# Patient Record
Sex: MALE | Race: ASIAN | NOT HISPANIC OR LATINO | Employment: FULL TIME | URBAN - METROPOLITAN AREA
[De-identification: names, ages, dates, MRNs, and addresses within clinical notes are randomized per-mention and may not be internally consistent; named-entity substitution may affect disease eponyms.]

---

## 2017-09-20 ENCOUNTER — OFFICE VISIT (OUTPATIENT)
Dept: URGENT CARE | Facility: CLINIC | Age: 42
End: 2017-09-20
Payer: COMMERCIAL

## 2017-09-20 VITALS
OXYGEN SATURATION: 96 % | BODY MASS INDEX: 40.81 KG/M2 | HEIGHT: 67 IN | RESPIRATION RATE: 16 BRPM | TEMPERATURE: 98 F | HEART RATE: 95 BPM | WEIGHT: 260 LBS | SYSTOLIC BLOOD PRESSURE: 154 MMHG | DIASTOLIC BLOOD PRESSURE: 104 MMHG

## 2017-09-20 DIAGNOSIS — L50.9 URTICARIA: ICD-10-CM

## 2017-09-20 DIAGNOSIS — T78.40XA ALLERGIC REACTION, INITIAL ENCOUNTER: Primary | ICD-10-CM

## 2017-09-20 PROCEDURE — 99203 OFFICE O/P NEW LOW 30 MIN: CPT | Mod: 25,S$GLB,, | Performed by: FAMILY MEDICINE

## 2017-09-20 PROCEDURE — 96372 THER/PROPH/DIAG INJ SC/IM: CPT | Mod: S$GLB,,, | Performed by: FAMILY MEDICINE

## 2017-09-20 PROCEDURE — 3008F BODY MASS INDEX DOCD: CPT | Mod: S$GLB,,, | Performed by: FAMILY MEDICINE

## 2017-09-20 RX ORDER — TOBRAMYCIN AND DEXAMETHASONE 3; 1 MG/ML; MG/ML
2 SUSPENSION/ DROPS OPHTHALMIC EVERY 6 HOURS
Qty: 1 BOTTLE | Refills: 0 | Status: SHIPPED | OUTPATIENT
Start: 2017-09-20 | End: 2017-09-23

## 2017-09-20 RX ORDER — BETAMETHASONE SODIUM PHOSPHATE AND BETAMETHASONE ACETATE 3; 3 MG/ML; MG/ML
9 INJECTION, SUSPENSION INTRA-ARTICULAR; INTRALESIONAL; INTRAMUSCULAR; SOFT TISSUE
Status: COMPLETED | OUTPATIENT
Start: 2017-09-20 | End: 2017-09-20

## 2017-09-20 RX ORDER — PREDNISONE 20 MG/1
20 TABLET ORAL DAILY
Qty: 4 TABLET | Refills: 0 | Status: SHIPPED | OUTPATIENT
Start: 2017-09-20 | End: 2017-09-24

## 2017-09-20 RX ADMIN — BETAMETHASONE SODIUM PHOSPHATE AND BETAMETHASONE ACETATE 9 MG: 3; 3 INJECTION, SUSPENSION INTRA-ARTICULAR; INTRALESIONAL; INTRAMUSCULAR; SOFT TISSUE at 10:09

## 2017-09-20 NOTE — PROGRESS NOTES
"Subjective:       Patient ID: Mauricio Fernandes is a 42 y.o. male.    Vitals:  height is 5' 7" (1.702 m) and weight is 117.9 kg (260 lb). His temperature is 98.3 °F (36.8 °C). His blood pressure is 154/104 (abnormal) and his pulse is 95. His respiration is 16 and oxygen saturation is 96%.     Chief Complaint: Rash and Facial Swelling    PATIENT REPORTS WAKING UP EARLY THIS MORNING AT 3AM WITH AN ITCHY, RED RASH TO CHEST AND BACK. PATIENT TOOK BENADRYL WHICH SEEMED TO HELP WITH THE RASH ON CHEDST AND BACK. PATIENT ALSO REPORTS FACE AND EYE SWELLING WITH POSSIBLY A LITTLE SWELLING OF THE THROAT AT THE SAME TIME AS THE RASH APPEARED ON CHEST AND BACK THIS MORNING.      Rash   This is a new problem. The current episode started today. The problem has been gradually improving since onset. The affected locations include the chest and back. The rash is characterized by redness, itchiness and swelling. It is unknown if there was an exposure to a precipitant. Pertinent negatives include no fever, joint pain, shortness of breath or sore throat. Treatments tried: BENADRYL. The treatment provided moderate relief.   Edema   This is a new problem. The current episode started today. The problem occurs constantly. The problem has been gradually improving. Associated symptoms include a rash. Pertinent negatives include no chills, fever or sore throat. Nothing aggravates the symptoms. He has tried nothing for the symptoms. The treatment provided mild relief.     Review of Systems   Constitution: Negative for chills and fever.   HENT: Negative for sore throat.    Eyes: Positive for blurred vision.        FACIAL SWELLING AND BLURRY VISION   Respiratory: Negative for shortness of breath.    Skin: Positive for itching and rash.   Musculoskeletal: Negative for joint pain.       Objective:      Physical Exam   Constitutional: He is oriented to person, place, and time. He appears well-developed and well-nourished.   HENT:   Head: " Normocephalic and atraumatic. Head is without abrasion, without contusion and without laceration.   Right Ear: External ear normal.   Left Ear: External ear normal.   Nose: Nose normal.   Mouth/Throat: Posterior oropharyngeal erythema present.   Eyes: EOM and lids are normal. Pupils are equal, round, and reactive to light. Right conjunctiva is injected. Left conjunctiva is injected.   Right sclera injected.    Neck: Trachea normal, full passive range of motion without pain and phonation normal. Neck supple.   Cardiovascular: Normal rate, regular rhythm and normal heart sounds.    Pulmonary/Chest: Effort normal and breath sounds normal. No stridor. No respiratory distress.   Musculoskeletal: Normal range of motion.   Neurological: He is alert and oriented to person, place, and time.   Skin: Skin is warm, dry and intact. Capillary refill takes less than 2 seconds. Rash noted. No abrasion, no bruising, no burn, no ecchymosis, no laceration and no lesion noted. Rash is urticarial. There is erythema.   Patient has edema of right upper eyelid more than left and again edema of right lower eyelid more than left.  He has no further urticarial lesions on the back or chest at this time.     He does have a stable lipoma right anterior chest wall about 2 cm diameter.  He also has scattered acne appearing lesions over chest and back that are usual for him per patient.    Psychiatric: He has a normal mood and affect. His speech is normal and behavior is normal. Judgment and thought content normal. Cognition and memory are normal.   Nursing note and vitals reviewed.      Assessment:       1. Allergic reaction, initial encounter    2. Urticaria        Plan:         Allergic reaction, initial encounter  -     betamethasone acetate-betamethasone sodium phosphate injection 9 mg; Inject 1.5 mLs (9 mg total) into the muscle one time.  -     predniSONE (DELTASONE) 20 MG tablet; Take 1 tablet (20 mg total) by mouth once daily.  Dispense: 4  tablet; Refill: 0  -     tobramycin-dexamethasone 0.3-0.1% (TOBRADEX) 0.3-0.1 % DrpS; Place 2 drops into both eyes every 6 (six) hours.  Dispense: 1 Bottle; Refill: 0    Urticaria      Mauricio ACOSTA was seen today for rash and facial swelling.    Diagnoses and all orders for this visit:    Allergic reaction, initial encounter  -     betamethasone acetate-betamethasone sodium phosphate injection 9 mg; Inject 1.5 mLs (9 mg total) into the muscle one time.  -     predniSONE (DELTASONE) 20 MG tablet; Take 1 tablet (20 mg total) by mouth once daily.  -     tobramycin-dexamethasone 0.3-0.1% (TOBRADEX) 0.3-0.1 % DrpS; Place 2 drops into both eyes every 6 (six) hours.    Urticaria            Follow Up Comments   Make sure that you follow up with your primary care doctor in the next 2-5 days if needed .  Return to the Urgent Care if signs or symptoms change and certainly if you have worsening symptoms go to the nearest emergency department for further evaluation.     Nicolasa Elliott MD

## 2017-09-20 NOTE — PATIENT INSTRUCTIONS
Elevated Blood Pressure  Your blood pressure was elevated during your visit to the urgent care.  It was not so high that immediate care was needed but it is recommended that you monitor your blood pressure over the next week or two to make sure that it is not staying elevated.  Please have your blood pressure taken 2-3 times daily at different times of the day.  Write all of those blood pressures down and record the time that they were taken.  Keep all that information and take it with you to see your Primary Care Physician.  If your blood pressure is consistently above 140/90 you will need to follow up with your PCP more quickly    General Allergic Reactions  An allergic reaction is a set of symptoms caused by an allergen. An allergen is something that causes a persons immune system to react. When a person comes in contact with an allergen, it causes the body to release chemicals. These include the chemical histamine. Histamine causes swelling and itching. It may affect the entire body. This is called a general allergic reaction. Often symptoms affect only 1 part of the body. This is called a local allergic reaction.  You are having an allergic reaction. Almost anything can cause one. Different people are allergic to different things. It is usually something that you ate or swallowed, came into contact with by getting or putting it on your skin or clothes, or something you breathed in the air. This can be very annoying and sometimes scary.  Most of us think of allergic reactions when we have a rash or itchy skin. Symptoms can include:  · Itching of the eyes, nose, and roof of the mouth  · Runny or stuffy nose  · Watery eyes   · Sneezing or coughing   · A blocked feeling in the ear  · Red, itchy rash called hives  · Red and purple spots  · Rash, redness, welts, blisters  · Itching, burning, stinging, pain  · Dry, flaky, cracking, scaly skin  Severe symptoms include:  · Swelling of the face, lips, or other parts  of the body  · Hoarse voice  · Trouble swallowing, feeling like your throat is closing  · Trouble breathing, wheezing  · Nausea, vomiting, diarrhea, stomach cramps  · Feeling faint or lightheaded, rapid heart rate  Sometimes the cause may be obvious. But there are so many things that can cause a reaction that you may not be able to figure out. The most important things to help find your allergen are:  · Remembering when it started  · What you were doing at the time or just before that  · Any activities you were involved in  · Any new products or contacts  Below are some common causes. But remember that almost anything can cause a reaction. You may not even be aware that you came into contact with one of these things:  · Dust, mold, pollen  · Plants (common ones are poison ivy and poison oak, but there are many others)   · Animals  · Foods such as shrimp, shellfish, peanuts, milk products, gluten, and eggs. Also food colorings, flavorings, and additives.  · Insect bites or stings such as bees, mosquitos, fleas, ticks  · Medicines such as penicillin, sulfa medicines, amoxicillin, aspirin, and ibuprofen. But any medicine can cause a reaction.  · Jewelry such as nickel or gold. This can be new, or something youve worn for a while, including zippers and buttons.  · Latex such as in gloves, clothes, toys, balloons, or some tapes. Some people allergic to latex may also have problems with foods like bananas, avocados, kiwi, papaya, or chestnuts.  · Lotions, perfumes, cosmetics, soaps, shampoos, skincare products, nail products  · Chemicals or dyes in clothing, linen, , hair dyes, soaps, iodine  Many viruses and common colds can cause a rash that is not an allergic reaction. Sometimes it is hard to tell the difference between allergies, sensitivity, or an intolerance to something. This is especially true with food. Many things can cause diarrhea, vomiting, stomach cramps, and skin irritation.  Home care    The goal  of treatment is to help relieve the symptoms and get you feeling better. The rash will usually fade over several days. But it can sometimes last a couple of weeks. Over the next couple of days, there may be times when it is gets a little worse, and then better again. Here are some things to do:  · If you know what you are allergic to, stay away from it. Future reactions could be worse than this one.  · Avoid tight clothing and anything that heats up your skin (hot showers or baths, direct sunlight). Heat will make itching worse.  · An ice pack will relieve local areas of intense itching and redness. To make an ice pack, put ice cubes in a plastic bag that seals at the top. Wrap it in a thin, clean towel. Dont put the ice directly on the skin because it can damage the skin.  · Oral diphenhydramine is an over-the-counter antihistamine sold at pharmacy and grocery stores. Unless a prescription antihistamine was given, diphenhydramine may be used to reduce itching if large areas of the skin are involved. It may make you sleepy. So be careful using it in the daytime or when going to school, working, or driving. Note: Dont use diphenhydramine if you have glaucoma or if you are a man with trouble urinating due to an enlarged prostate. There are other antihistamines that wont make you so sleepy. These are good choices for daytime use. Ask your pharmacist for suggestions.  · Dont use diphenhydramine cream on your skin. It can cause a further reaction in some people.  · To help prevent an infection, don't scratch the affected area. Scratching may worsen the reaction and damage your skin. It can also lead to an infection. Always check the affected for signs of an infection.  · Call your healthcare provider and ask what you can use to help decrease the itching.  · To decrease allergic reactions, try the following:    · Use heat-steam to clean your home  · Use high-efficiency particulate (HEPA) vacuums and filters  · Stay  away from food and pet triggers  · Kill any cockroaches  · Clean your house often  Follow-up care  Follow up with your healthcare provider, or as advised. If you had a severe reaction today, or if you have had several mild to medium allergic reactions in the past, ask your provider about allergy testing. This can help you find out what you are allergic to. If your reaction included dizziness, fainting, or trouble breathing or swallowing, ask your provider about carrying auto-injectable epinephrine.  Call 911  Call 911 if any of these occur:  · Trouble breathing or swallowing, wheezing  · Cool, moist, pale skin  · Shortness of breath  · Hoarse voice or trouble speaking  · Confused   · Very drowsy or trouble awakening  · Fainting or loss of consciousness  · Rapid heart rate  · Feeling of dizziness or weakness or a sudden drop in blood pressure  · Feeling of doom  · Feeling lightheaded  · Severe nausea or vomiting, or diarrhea  · Seizure  · Swelling in the face, eyelids, lips, mouth, throat or tongue  · Drooling  When to seek medical advice  Call your healthcare provider right away if any of these occur:  · Spreading areas of itching, redness or swelling  · Nausea or stomach cramps or abdominal pain  · Continuing or recurring symptoms  · Spreading areas of redness, swelling, or itching  · Signs of infection at the affected site:  ¨ Spreading redness  ¨ Increased pain or swelling  ¨ Fluid or colored drainage from the site  ¨ Fever of 100.4°F (38°C) or above lasting for 24 to 48 hours, or as directed by your provider  Date Last Reviewed: 3/1/2017  © 1822-5616 [employer]. [address] [place] [address], [place] [address]. All rights reserved. This information is not intended as a substitute for professional medical care. Always follow your healthcare professional's instructions.        Hives (Adult)  Hives are pink or red bumps on the skin. These bumps are also known as wheals. The bumps can itch, burn, or sting.  Hives can occur anywhere on the body. They vary in size and shape and can form in clusters. Individual hives can appear and go away quickly. New hives may develop as old ones fade. Hives are common and usually harmless. Occasionally hives are a sign of a serious allergy.  Hives are often caused by an allergic reaction. It may be an allergic reaction to foods such as fruit, shellfish, chocolate, nuts, or tomatoes. It may be a reaction to pollens, animal fur, or mold spores. Medicines, chemicals, and insect bites can also cause hives. And hives can be caused by hot sun or cold air. The cause of hives can be difficult to find.  You may be given medicines to relieve swelling and itching. Follow all instructions when using these medicines. The hives will usually fade in a few days, but can last up to 2 weeks.  Home care  Follow these tips:  · Try to find the cause of the hives and eliminate it. Discuss possible causes with your healthcare provider. Future reactions to the same allergen may be worse.  · Dont scratch the hives. Scratching will delay healing. To reduce itching, apply cool, wet compresses to the skin.  · Dress in soft, loose cotton clothing.  · Dont bathe in hot water. This can make the itching worse.  · Apply an ice pack or cool pack wrapped in a thin towel to your skin. This will help reduce redness and itching. But if your hives were caused by exposure to cold, then do not apply more cold to them.  · You may use over-the counter antihistamines to reduce itching. Some older antihistamines, such as diphenhydramine and chlorpheniramine, are inexpensive. But they need to be taken often and may make you sleepy. They are best used at bedtime. Dont use diphenhydramine if you have glaucoma or have trouble urinating because of an enlarged prostate. Newer antihistamines, such as loratadine, cetirizine, and fexofenadine, are generally more expensive. But they tend to have fewer side effects, such as drowsiness.  They can be taken less often.  · Another type of antihistamine is used to treat heartburn. This type includes ranitidine, nizatidine, famotidine, and cimetidine. These are sometimes used along with the above antihistamines if a single medicine is not working.  Follow-up care  Follow up with your healthcare provider if your symptoms don't get better in 2 days. Ask your provider about allergy testing if you have had a severe reaction, or have had several episodes of hives. He or she can use the allergy testing to find out what you are allergic to.  When to seek medical advice  Call your healthcare provider right away if any of these occur:  · Fever of 100.4°F (38.0°C) or higher, or as directed by your healthcare provider  · Redness, swelling, or pain  · Foul-smelling fluid coming from the rash  Call 911  Call 911 if any of the following occur:  · Swelling of the face, throat, or tongue  · Trouble breathing or swallowing  · Dizziness, weakness, or fainting  Date Last Reviewed: 9/1/2016 © 2000-2017 [employer]. [address] [place] [address]. All rights reserved. This information is not intended as a substitute for professional medical care. Always follow your healthcare professional's instructions.      Mauricio ACOSTA was seen today for rash and facial swelling.    Diagnoses and all orders for this visit:    Allergic reaction, initial encounter  -     betamethasone acetate-betamethasone sodium phosphate injection 9 mg; Inject 1.5 mLs (9 mg total) into the muscle one time.  -     predniSONE (DELTASONE) 20 MG tablet; Take 1 tablet (20 mg total) by mouth once daily.  -     tobramycin-dexamethasone 0.3-0.1% (TOBRADEX) 0.3-0.1 % DrpS; Place 2 drops into both eyes every 6 (six) hours.    Urticaria            Follow Up Comments   Make sure that you follow up with your primary care doctor in the next 2-5 days if needed .  Return to the Urgent Care if signs or symptoms change and certainly if you have  worsening symptoms go to the nearest emergency department for further evaluation.     Nicolasa Elliott MD

## 2018-08-02 ENCOUNTER — OFFICE VISIT (OUTPATIENT)
Dept: URGENT CARE | Facility: CLINIC | Age: 43
End: 2018-08-02
Payer: COMMERCIAL

## 2018-08-02 VITALS
HEIGHT: 67 IN | TEMPERATURE: 99 F | RESPIRATION RATE: 16 BRPM | OXYGEN SATURATION: 96 % | SYSTOLIC BLOOD PRESSURE: 134 MMHG | WEIGHT: 260 LBS | HEART RATE: 102 BPM | DIASTOLIC BLOOD PRESSURE: 67 MMHG | BODY MASS INDEX: 40.81 KG/M2

## 2018-08-02 DIAGNOSIS — J30.1 SEASONAL ALLERGIC RHINITIS DUE TO POLLEN: Primary | ICD-10-CM

## 2018-08-02 DIAGNOSIS — J06.9 URI, ACUTE: ICD-10-CM

## 2018-08-02 PROCEDURE — 3008F BODY MASS INDEX DOCD: CPT | Mod: CPTII,S$GLB,, | Performed by: FAMILY MEDICINE

## 2018-08-02 PROCEDURE — 99213 OFFICE O/P EST LOW 20 MIN: CPT | Mod: 25,S$GLB,, | Performed by: FAMILY MEDICINE

## 2018-08-02 PROCEDURE — 96372 THER/PROPH/DIAG INJ SC/IM: CPT | Mod: S$GLB,,, | Performed by: FAMILY MEDICINE

## 2018-08-02 RX ORDER — BETAMETHASONE SODIUM PHOSPHATE AND BETAMETHASONE ACETATE 3; 3 MG/ML; MG/ML
6 INJECTION, SUSPENSION INTRA-ARTICULAR; INTRALESIONAL; INTRAMUSCULAR; SOFT TISSUE
Status: COMPLETED | OUTPATIENT
Start: 2018-08-02 | End: 2018-08-02

## 2018-08-02 RX ADMIN — BETAMETHASONE SODIUM PHOSPHATE AND BETAMETHASONE ACETATE 6 MG: 3; 3 INJECTION, SUSPENSION INTRA-ARTICULAR; INTRALESIONAL; INTRAMUSCULAR; SOFT TISSUE at 03:08

## 2018-08-02 NOTE — LETTER
August 2, 2018      [facility]  [place]  [place] [address]  Phone: [phone]  Fax: [phone]       Patient: Mauricio Fernandes   YOB: 1975  Date of Visit: 08/02/2018    To Whom It May Concern:    Kaylah Fernandes  was at [facility] on 08/02/2018. He may return to work/school on 08/04/2018 with no restrictions. If you have any questions or concerns, or if I can be of further assistance, please do not hesitate to contact me.    Sincerely,      Shy Umana MA

## 2018-08-02 NOTE — PROGRESS NOTES
"Subjective:       Patient ID: Mauricio Fernandes is a 42 y.o. male.    Vitals:  height is 5' 7" (1.702 m) and weight is 117.9 kg (260 lb). His oral temperature is 99.3 °F (37.4 °C). His blood pressure is 134/67 and his pulse is 102. His respiration is 16 and oxygen saturation is 96%.     Chief Complaint: Sinus Problem    Patient states his symptoms started on 8/1/2018.      Sinus Problem   This is a new problem. The current episode started yesterday. The problem is unchanged. His pain is at a severity of 6/10. The pain is moderate. Associated symptoms include congestion, coughing, headaches, sinus pressure, sneezing and a sore throat. Pertinent negatives include no chills, ear pain, hoarse voice or shortness of breath. Past treatments include oral decongestants. The treatment provided no relief.     Review of Systems   Constitution: Negative for chills, fever and malaise/fatigue.   HENT: Positive for congestion, sinus pressure, sneezing and sore throat. Negative for ear pain and hoarse voice.    Eyes: Negative for discharge and redness.   Cardiovascular: Negative for chest pain, dyspnea on exertion and leg swelling.   Respiratory: Positive for cough and wheezing. Negative for shortness of breath and sputum production.    Musculoskeletal: Negative for myalgias.   Gastrointestinal: Negative for abdominal pain and nausea.   Neurological: Positive for headaches.       Objective:      Physical Exam   Constitutional: He is oriented to person, place, and time. He appears well-developed and well-nourished. He is cooperative.  Non-toxic appearance. He does not appear ill. No distress.   HENT:   Head: Normocephalic and atraumatic.   Right Ear: Hearing, tympanic membrane, external ear and ear canal normal.   Left Ear: Hearing, tympanic membrane, external ear and ear canal normal.   Nose: Nose normal. No mucosal edema, rhinorrhea or nasal deformity. No epistaxis. Right sinus exhibits no maxillary sinus tenderness and no frontal " sinus tenderness. Left sinus exhibits no maxillary sinus tenderness and no frontal sinus tenderness.   Mouth/Throat: Uvula is midline, oropharynx is clear and moist and mucous membranes are normal. No trismus in the jaw. Normal dentition. No uvula swelling. No oropharyngeal exudate or posterior oropharyngeal erythema.   Edematous nasal mucosa     Eyes: Conjunctivae and lids are normal. Right eye exhibits no discharge. Left eye exhibits no discharge. No scleral icterus.   Sclera clear bilat   Neck: Trachea normal, normal range of motion, full passive range of motion without pain and phonation normal. Neck supple.   Cardiovascular: Normal rate, regular rhythm, normal heart sounds, intact distal pulses and normal pulses.    No murmur heard.  Pulmonary/Chest: Effort normal and breath sounds normal. He has no wheezes.   Abdominal: Soft. Normal appearance and bowel sounds are normal. He exhibits no distension, no pulsatile midline mass and no mass. There is no tenderness.   Musculoskeletal: Normal range of motion. He exhibits no edema or deformity.   Neurological: He is alert and oriented to person, place, and time. He exhibits normal muscle tone. Coordination normal.   Skin: Skin is warm, dry and intact. He is not diaphoretic. No pallor.   Psychiatric: He has a normal mood and affect. His speech is normal and behavior is normal. Judgment and thought content normal. Cognition and memory are normal.   Nursing note and vitals reviewed.      Assessment:       1. Seasonal allergic rhinitis due to pollen    2. URI, acute        Plan:         Seasonal allergic rhinitis due to pollen  -     betamethasone acetate-betamethasone sodium phosphate injection 6 mg; Inject 1 mL (6 mg total) into the muscle one time.    URI, acute        Mucinex D one bid  Claritin one daily  Flonase one spray once daily

## 2018-08-05 ENCOUNTER — TELEPHONE (OUTPATIENT)
Dept: URGENT CARE | Facility: CLINIC | Age: 43
End: 2018-08-05

## 2018-08-28 ENCOUNTER — OFFICE VISIT (OUTPATIENT)
Dept: URGENT CARE | Facility: CLINIC | Age: 43
End: 2018-08-28
Payer: COMMERCIAL

## 2018-08-28 VITALS
WEIGHT: 265 LBS | OXYGEN SATURATION: 95 % | TEMPERATURE: 98 F | HEART RATE: 95 BPM | DIASTOLIC BLOOD PRESSURE: 110 MMHG | RESPIRATION RATE: 18 BRPM | SYSTOLIC BLOOD PRESSURE: 200 MMHG | BODY MASS INDEX: 41.59 KG/M2 | HEIGHT: 67 IN

## 2018-08-28 DIAGNOSIS — S13.9XXA NECK SPRAIN, INITIAL ENCOUNTER: ICD-10-CM

## 2018-08-28 DIAGNOSIS — M54.2 NECK PAIN: Primary | ICD-10-CM

## 2018-08-28 PROCEDURE — 99213 OFFICE O/P EST LOW 20 MIN: CPT | Mod: S$GLB,,, | Performed by: FAMILY MEDICINE

## 2018-08-28 RX ORDER — PREDNISONE 10 MG/1
TABLET ORAL
Qty: 30 TABLET | Refills: 0 | Status: SHIPPED | OUTPATIENT
Start: 2018-08-28 | End: 2019-06-12

## 2018-08-28 RX ORDER — NAPROXEN 500 MG/1
500 TABLET ORAL 2 TIMES DAILY WITH MEALS
Qty: 30 TABLET | Refills: 2 | Status: SHIPPED | OUTPATIENT
Start: 2018-08-28 | End: 2019-04-03

## 2018-08-28 NOTE — PROGRESS NOTES
"Subjective:       Patient ID: Mauricio Fernandes is a 43 y.o. male.    Vitals:  height is 5' 7" (1.702 m) and weight is 120.2 kg (265 lb). His temperature is 98.2 °F (36.8 °C). His blood pressure is 200/110 (abnormal) and his pulse is 95. His respiration is 18 and oxygen saturation is 95%.     Chief Complaint: Neck Pain    Patient states that he slept wrong and woke up with Neck Pain on Sunday evening. On today patient states that he started with dizziness from the neck pain.  with c/o left sided neck pain, now some feeling of dizzyness, no n/v/, no injury or trauma, states woke up with Sx 3 days ago,      Neck Pain    This is a new problem. The current episode started in the past 7 days. The problem has been gradually worsening. The pain is associated with a sleep position. The pain is present in the left side. The quality of the pain is described as shooting. The pain is at a severity of 7/10. The pain is severe. The symptoms are aggravated by position. The pain is same all the time. Stiffness is present all day. Pertinent negatives include no chest pain, fever or headaches. Treatments tried: Ibuprofen  The treatment provided significant relief.     Review of Systems   Constitution: Negative for chills and fever.   HENT: Negative for sore throat.    Eyes: Negative for blurred vision.   Cardiovascular: Negative for chest pain.   Respiratory: Negative for shortness of breath.    Skin: Negative for rash.   Musculoskeletal: Positive for neck pain and stiffness. Negative for back pain and joint pain.   Gastrointestinal: Negative for abdominal pain, diarrhea, nausea and vomiting.   Neurological: Positive for dizziness. Negative for headaches.   Psychiatric/Behavioral: The patient is not nervous/anxious.        Objective:      Physical Exam   Constitutional: He is oriented to person, place, and time. He appears well-developed and well-nourished. He is cooperative.  Non-toxic appearance. He does not appear " ill. No distress.   HENT:   Head: Normocephalic and atraumatic.   Right Ear: Hearing, tympanic membrane, external ear and ear canal normal.   Left Ear: Hearing, tympanic membrane, external ear and ear canal normal.   Nose: Nose normal. No mucosal edema, rhinorrhea or nasal deformity. No epistaxis. Right sinus exhibits no maxillary sinus tenderness and no frontal sinus tenderness. Left sinus exhibits no maxillary sinus tenderness and no frontal sinus tenderness.   Mouth/Throat: Uvula is midline, oropharynx is clear and moist and mucous membranes are normal. No trismus in the jaw. Normal dentition. No uvula swelling. No posterior oropharyngeal erythema.   Eyes: Conjunctivae and lids are normal. Right eye exhibits no discharge. Left eye exhibits no discharge. No scleral icterus.   Sclera clear bilat   Neck: Trachea normal, normal range of motion, full passive range of motion without pain and phonation normal. Neck supple.   Mild tenderness left trapezius muscle,laterl left movement with increase tenderness and extension with increase tenderness     Cardiovascular: Normal rate, regular rhythm, normal heart sounds, intact distal pulses and normal pulses.   Pulmonary/Chest: Effort normal and breath sounds normal. No respiratory distress.   Abdominal: Soft. Normal appearance and bowel sounds are normal. He exhibits no distension, no pulsatile midline mass and no mass. There is no tenderness.   Musculoskeletal: Normal range of motion. He exhibits no edema or deformity.   Lymphadenopathy:     He has no cervical adenopathy.   Neurological: He is alert and oriented to person, place, and time. He displays normal reflexes. No cranial nerve deficit. He exhibits normal muscle tone.   Skin: Skin is warm, dry and intact. He is not diaphoretic. No pallor.   Psychiatric: He has a normal mood and affect. His speech is normal and behavior is normal. Judgment and thought content normal. Cognition and memory are normal.   Nursing note and  vitals reviewed.      Assessment:       1. Neck pain    2. Neck sprain, initial encounter        Plan:         Neck pain    Neck sprain, initial encounter    Other orders  -     predniSONE (DELTASONE) 10 MG tablet; Take 2 po bid x 3 days, one bid x 3 days, then one daily till finish  Dispense: 30 tablet; Refill: 0  -     naproxen (NAPROSYN) 500 MG tablet; Take 1 tablet (500 mg total) by mouth 2 (two) times daily with meals.  Dispense: 30 tablet; Refill: 2       Apply heating pad.  RTC if increase pain or stiffness

## 2018-08-28 NOTE — PATIENT INSTRUCTIONS
Neck Pain    There are several possible causes of neck pain when there is no injury:  · You can get a minor ligament sprain or muscle strain from a sudden minor neck movement. Sleeping with your neck in an awkward position can also cause this.  · Some people respond to emotional stress by tensing the muscles of their neck, shoulders, and upper back. Chronic spasm in these muscles can cause neck pain and sometimes headaches.  · Gradual wear and tear of the joints in the spine can cause degenerative arthritis. This can be a source of occasional or chronic neck pain.  · The spinal disks may bulge and put pressure on a nearby spinal nerve. This can happen as a natural result of aging or repeated small injuries to the neck. The spinal disks are the cushions between each spinal bone. This causes tingling, pain, or numbness that spreads from the neck to the shoulder, arm, or hand on one side.  Acute neck pain usually gets better in 1 to 2 weeks. Neck pain related to disk disease, arthritis in the spinal joints, or spinal stenosis can become chronic and last for months or years. Spinal stenosis is narrowing of the spinal canal.  X-rays are usually not ordered for the initial evaluation of neck pain. However, X-rays may be done if you had a forceful physical injury, such as a car accident or fall. If pain continues and doesnt respond to medical treatment, X-rays and other tests may be done at a later time.  Home care  · Rest and relax the muscles. Use a comfortable pillow that supports the head. It should also help keep the spine in a neutral position. The position of the head should not be tilted forward or backward. A rolled up towel may help for a custom fit.  · Some people find relief with heat. Heat can be applied with either a warm shower or bath or a moist towel heated in the microwave and massage. Others prefer cold packs. You can make an ice pack by filling a plastic bag that seals at the top with ice cubes or  crushed ice and then wrapping it with a thin towel. Try both and use the method that feels best for 15 to 20 minutes, several times a day.  · Whether using ice or heat, be careful that you do not injure your skin. Never put ice directly on the skin. Always wrap the ice in a towel or other type of cloth.This is very important, especially in people with poor skin sensations.   · Try to reduce your stress level. Emotional stress can lead to neck muscle tension and get in the way of or delay the healing process.  · You may use over-the-counter pain medicine to control pain, unless another medicine was prescribed. If you have chronic liver or kidney disease or ever had a stomach ulcer or GI bleeding, talk with your healthcare provider before using these medicines.  Follow-up care  Follow up with your healthcare provider if your symptoms do not show signs of improvement after one week. Physical therapy or further tests may be needed.  If X-rays, CT scans, or MRI scans were taken, you will be told of any new findings that may affect your care.  Call 911  Call 911 if you have:  · Sudden weakness or numbness in one or both arms  · Neck swelling, difficulty or painful swallowing  · Difficulty breathing  · Chest pain  When to seek medical advice  Call your healthcare provider right away if any of these occur:  · Pain becomes worse or spreads into one or both arm  · Increasing headache  · Fever of 100.4°F (38°C) or above lasting for 24 to 48 hours  Date Last Reviewed: 7/1/2016  © 1668-6634 [employer]. [address] [place] [address], [place] [address]. All rights reserved. This information is not intended as a substitute for professional medical care. Always follow your healthcare professional's instructions.

## 2019-01-13 ENCOUNTER — OFFICE VISIT (OUTPATIENT)
Dept: URGENT CARE | Facility: CLINIC | Age: 44
End: 2019-01-13
Payer: COMMERCIAL

## 2019-01-13 VITALS
HEIGHT: 68 IN | BODY MASS INDEX: 42.44 KG/M2 | WEIGHT: 280 LBS | OXYGEN SATURATION: 97 % | TEMPERATURE: 99 F | RESPIRATION RATE: 18 BRPM | DIASTOLIC BLOOD PRESSURE: 90 MMHG | SYSTOLIC BLOOD PRESSURE: 168 MMHG | HEART RATE: 86 BPM

## 2019-01-13 DIAGNOSIS — M10.00 ACUTE IDIOPATHIC GOUT, UNSPECIFIED SITE: Primary | ICD-10-CM

## 2019-01-13 DIAGNOSIS — M25.421 PAIN AND SWELLING OF RIGHT ELBOW: ICD-10-CM

## 2019-01-13 DIAGNOSIS — M25.521 PAIN AND SWELLING OF RIGHT ELBOW: ICD-10-CM

## 2019-01-13 PROCEDURE — 3008F PR BODY MASS INDEX (BMI) DOCUMENTED: ICD-10-PCS | Mod: CPTII,S$GLB,, | Performed by: FAMILY MEDICINE

## 2019-01-13 PROCEDURE — 99213 OFFICE O/P EST LOW 20 MIN: CPT | Mod: 25,S$GLB,, | Performed by: FAMILY MEDICINE

## 2019-01-13 PROCEDURE — 3008F BODY MASS INDEX DOCD: CPT | Mod: CPTII,S$GLB,, | Performed by: FAMILY MEDICINE

## 2019-01-13 PROCEDURE — 99213 PR OFFICE/OUTPT VISIT, EST, LEVL III, 20-29 MIN: ICD-10-PCS | Mod: 25,S$GLB,, | Performed by: FAMILY MEDICINE

## 2019-01-13 PROCEDURE — 96372 PR INJECTION,THERAP/PROPH/DIAG2ST, IM OR SUBCUT: ICD-10-PCS | Mod: S$GLB,,, | Performed by: FAMILY MEDICINE

## 2019-01-13 PROCEDURE — 96372 THER/PROPH/DIAG INJ SC/IM: CPT | Mod: S$GLB,,, | Performed by: FAMILY MEDICINE

## 2019-01-13 RX ORDER — BETAMETHASONE SODIUM PHOSPHATE AND BETAMETHASONE ACETATE 3; 3 MG/ML; MG/ML
6 INJECTION, SUSPENSION INTRA-ARTICULAR; INTRALESIONAL; INTRAMUSCULAR; SOFT TISSUE
Status: COMPLETED | OUTPATIENT
Start: 2019-01-13 | End: 2019-01-13

## 2019-01-13 RX ORDER — INDOMETHACIN 50 MG/1
50 CAPSULE ORAL 3 TIMES DAILY PRN
Qty: 30 CAPSULE | Refills: 0 | Status: SHIPPED | OUTPATIENT
Start: 2019-01-13 | End: 2019-04-03

## 2019-01-13 RX ADMIN — BETAMETHASONE SODIUM PHOSPHATE AND BETAMETHASONE ACETATE 6 MG: 3; 3 INJECTION, SUSPENSION INTRA-ARTICULAR; INTRALESIONAL; INTRAMUSCULAR; SOFT TISSUE at 03:01

## 2019-01-13 NOTE — PATIENT INSTRUCTIONS

## 2019-01-13 NOTE — PROGRESS NOTES
"Subjective:       Patient ID: Mauricio Fernandes is a 43 y.o. male.    Vitals:  height is 5' 8" (1.727 m) and weight is 127 kg (280 lb). His oral temperature is 98.6 °F (37 °C). His blood pressure is 168/90 (abnormal) and his pulse is 86. His respiration is 18 and oxygen saturation is 97%.     Chief Complaint: Joint Swelling (pain x 4 days)    44 y/o male  with c/o right elbow pain and swelling x 2 days, no fever, no injury or trauma, gets occasional attacks on no regular medications.      Elbow Injury   This is a recurrent problem. The current episode started in the past 7 days. The problem occurs constantly. The problem has been gradually worsening. Associated symptoms include joint swelling. Pertinent negatives include no arthralgias, chest pain, chills, congestion, coughing, fatigue, fever, headaches, myalgias, nausea, rash, sore throat, vertigo or vomiting. The symptoms are aggravated by bending. He has tried NSAIDs for the symptoms. The treatment provided mild relief.       Constitution: Negative for chills, fatigue and fever.   HENT: Negative for congestion and sore throat.    Neck: Negative for painful lymph nodes.   Cardiovascular: Negative for chest pain and leg swelling.   Eyes: Negative for double vision and blurred vision.   Respiratory: Negative for cough and shortness of breath.    Gastrointestinal: Negative for nausea, vomiting and diarrhea.   Genitourinary: Negative for dysuria, frequency and urgency.   Musculoskeletal: Positive for joint swelling and gout. Negative for joint pain, muscle cramps and muscle ache.   Skin: Negative for color change, pale and rash.   Allergic/Immunologic: Negative for seasonal allergies.   Neurological: Negative for dizziness, history of vertigo, light-headedness, passing out and headaches.   Hematologic/Lymphatic: Negative for swollen lymph nodes, easy bruising/bleeding and history of blood clots. Does not bruise/bleed easily.   Psychiatric/Behavioral: " Negative for nervous/anxious, sleep disturbance and depression. The patient is not nervous/anxious.        Objective:      Physical Exam   Constitutional: He is oriented to person, place, and time. He appears well-developed and well-nourished. He is cooperative.  Non-toxic appearance. He does not appear ill. No distress.   HENT:   Head: Normocephalic and atraumatic.   Right Ear: Hearing, tympanic membrane, external ear and ear canal normal.   Left Ear: Hearing, tympanic membrane, external ear and ear canal normal.   Nose: Nose normal. No mucosal edema, rhinorrhea or nasal deformity. No epistaxis. Right sinus exhibits no maxillary sinus tenderness and no frontal sinus tenderness. Left sinus exhibits no maxillary sinus tenderness and no frontal sinus tenderness.   Mouth/Throat: Uvula is midline, oropharynx is clear and moist and mucous membranes are normal. No trismus in the jaw. Normal dentition. No uvula swelling. No oropharyngeal exudate or posterior oropharyngeal erythema.   Eyes: Conjunctivae and lids are normal. Right eye exhibits no discharge. Left eye exhibits no discharge.   Sclera clear bilat   Neck: Trachea normal, normal range of motion, full passive range of motion without pain and phonation normal. Neck supple.   Cardiovascular: Normal rate, regular rhythm, intact distal pulses and normal pulses.   No murmur heard.  Regularly irregular heart beat, no murmur   Pulmonary/Chest: Effort normal and breath sounds normal. No stridor. He has no wheezes.   Abdominal: Soft. Normal appearance and bowel sounds are normal. He exhibits no distension, no pulsatile midline mass and no mass. There is no tenderness.   Musculoskeletal: Normal range of motion. He exhibits no edema or deformity.   Right elbow with mild swelling and warmth, no streaking, no wound   Lymphadenopathy:     He has no cervical adenopathy.   Neurological: He is alert and oriented to person, place, and time. He exhibits normal muscle tone.  Coordination normal.   Skin: Skin is warm, dry and intact. He is not diaphoretic. No pallor.   Psychiatric: He has a normal mood and affect. His speech is normal and behavior is normal. Judgment and thought content normal. Cognition and memory are normal.   Nursing note and vitals reviewed.      Assessment:       1. Acute idiopathic gout, unspecified site    2. Pain and swelling of right elbow        Plan:         Acute idiopathic gout, unspecified site    Pain and swelling of right elbow    Other orders  -     betamethasone acetate-betamethasone sodium phosphate injection 6 mg          Pt. Advised to FU with PCP for PVCs

## 2019-04-03 ENCOUNTER — OFFICE VISIT (OUTPATIENT)
Dept: URGENT CARE | Facility: CLINIC | Age: 44
End: 2019-04-03
Payer: COMMERCIAL

## 2019-04-03 VITALS
BODY MASS INDEX: 42.44 KG/M2 | HEIGHT: 68 IN | OXYGEN SATURATION: 96 % | HEART RATE: 54 BPM | DIASTOLIC BLOOD PRESSURE: 100 MMHG | WEIGHT: 280 LBS | RESPIRATION RATE: 18 BRPM | SYSTOLIC BLOOD PRESSURE: 158 MMHG | TEMPERATURE: 99 F

## 2019-04-03 DIAGNOSIS — I10 ESSENTIAL HYPERTENSION: ICD-10-CM

## 2019-04-03 DIAGNOSIS — M10.071 ACUTE IDIOPATHIC GOUT OF RIGHT FOOT: Primary | ICD-10-CM

## 2019-04-03 DIAGNOSIS — M79.671 PAIN OF RIGHT FOOT: ICD-10-CM

## 2019-04-03 PROCEDURE — 3080F PR MOST RECENT DIASTOLIC BLOOD PRESSURE >= 90 MM HG: ICD-10-PCS | Mod: CPTII,S$GLB,, | Performed by: NURSE PRACTITIONER

## 2019-04-03 PROCEDURE — 3077F SYST BP >= 140 MM HG: CPT | Mod: CPTII,S$GLB,, | Performed by: NURSE PRACTITIONER

## 2019-04-03 PROCEDURE — 3080F DIAST BP >= 90 MM HG: CPT | Mod: CPTII,S$GLB,, | Performed by: NURSE PRACTITIONER

## 2019-04-03 PROCEDURE — 3008F PR BODY MASS INDEX (BMI) DOCUMENTED: ICD-10-PCS | Mod: CPTII,S$GLB,, | Performed by: NURSE PRACTITIONER

## 2019-04-03 PROCEDURE — 3077F PR MOST RECENT SYSTOLIC BLOOD PRESSURE >= 140 MM HG: ICD-10-PCS | Mod: CPTII,S$GLB,, | Performed by: NURSE PRACTITIONER

## 2019-04-03 PROCEDURE — 3008F BODY MASS INDEX DOCD: CPT | Mod: CPTII,S$GLB,, | Performed by: NURSE PRACTITIONER

## 2019-04-03 PROCEDURE — 99214 OFFICE O/P EST MOD 30 MIN: CPT | Mod: S$GLB,,, | Performed by: NURSE PRACTITIONER

## 2019-04-03 PROCEDURE — 99214 PR OFFICE/OUTPT VISIT, EST, LEVL IV, 30-39 MIN: ICD-10-PCS | Mod: S$GLB,,, | Performed by: NURSE PRACTITIONER

## 2019-04-03 RX ORDER — INDOMETHACIN 50 MG/1
50 CAPSULE ORAL 3 TIMES DAILY
Qty: 30 CAPSULE | Refills: 0 | Status: SHIPPED | OUTPATIENT
Start: 2019-04-03 | End: 2019-06-12

## 2019-04-03 NOTE — LETTER
April 3, 2019      [facility]  [place]  [place] [address]  Phone: [phone]  Fax: [phone]       Patient: Mauricio Fernandes   YOB: 1975  Date of Visit: 04/03/2019    To Whom It May Concern:    Kaylah Fernandes  was at [facility] on 04/03/2019. He may return to work/school on 4/5/19 with no restrictions. If you have any questions or concerns, or if I can be of further assistance, please do not hesitate to contact me.    Sincerely,        Alexsandra Ladd, NP

## 2019-04-03 NOTE — PROGRESS NOTES
"Subjective:       Patient ID: Mauricio Fernandes is a 43 y.o. male.    Vitals:  height is 5' 8" (1.727 m) and weight is 127 kg (280 lb). His temperature is 98.7 °F (37.1 °C). His blood pressure is 158/100 (abnormal) and his pulse is 54 (abnormal). His respiration is 18 and oxygen saturation is 96%.     Chief Complaint: Ankle Pain (RT ANKLE)    Pt. Reports that he has a gout flare in the right ankle that began 3 days ago.  He has not taken anything for the symptoms.  He last had a gout flare in 1/2019-- seen by Dr. Jacobsen here- medications he was prescribed worked.  His blood pressure is also elevated-- patient reports that his pressure does go up when he has a gout flare. He has not been taking anything for HTN.     Ankle Pain    The incident occurred 3 to 5 days ago. The incident occurred at home. There was no injury mechanism. The pain is present in the right ankle. The quality of the pain is described as aching. The pain is at a severity of 8/10. The pain is moderate. The pain has been constant since onset. He reports no foreign bodies present. The symptoms are aggravated by movement. He has tried heat, ice and elevation for the symptoms. The treatment provided no relief.       Constitution: Negative for chills, fatigue and fever.   HENT: Negative for congestion and sore throat.    Neck: Negative for painful lymph nodes.   Cardiovascular: Negative for chest pain and leg swelling.   Eyes: Negative for double vision and blurred vision.   Respiratory: Negative for cough and shortness of breath.    Gastrointestinal: Negative for nausea, vomiting and diarrhea.   Genitourinary: Negative for dysuria, frequency and urgency.   Musculoskeletal: Positive for pain, joint pain and joint swelling. Negative for muscle cramps and muscle ache.   Skin: Negative for color change, pale and rash.   Allergic/Immunologic: Negative for seasonal allergies.   Neurological: Negative for dizziness, history of vertigo, light-headedness, " passing out and headaches.   Hematologic/Lymphatic: Negative for swollen lymph nodes, easy bruising/bleeding and history of blood clots. Does not bruise/bleed easily.   Psychiatric/Behavioral: Negative for nervous/anxious, sleep disturbance and depression. The patient is not nervous/anxious.        Objective:      Physical Exam    Assessment:       1. Acute idiopathic gout of right foot    2. Pain of right foot    3. Essential hypertension        Plan:         Acute idiopathic gout of right foot    Pain of right foot    Essential hypertension    Other orders  -     indomethacin (INDOCIN) 50 MG capsule; Take 1 capsule (50 mg total) by mouth 3 (three) times daily. Prn gout flare  Dispense: 30 capsule; Refill: 0      Patient Instructions       F/u with PCP regarding long term management of gout and High Blood pressure within the week.     Treating Gout Attacks     Raising the joint above the level of your heart can help reduce gout symptoms.     Gout is a disease that affects the joints. It is caused by excess uric acid in your blood stream that may lead to crystals forming in your joints. Left untreated, it can lead to painful foot and joint deformities and even kidney problems. But, by treating gout early, you can relieve pain and help prevent future problems. Gout can usually be treated with medication and proper diet. In severe cases, surgery may be needed.  Gout attacks are painful and often happen more than once. Taking medications may reduce pain and prevent attacks in the future. There are also some things you can do at home to relieve symptoms.  Medications for gout  Your healthcare provider may prescribe a daily medication to reduce levels of uric acid. Reducing your uric acid levels may help prevent gout attacks. Allopurinol is one commonly used medication taken daily to reduce uric acid levels. Other medications can help relieve pain and swelling during an acute attack. Medicines such as NSAIDs  (nonsteroidal anti-inflammatory medicines), steroids, and colchicine may be prescribed for intermittent use to relieve an acute gout attack. Be sure to take your medication as directed.  What you can do  Below are some things you can do at home to relieve gout symptoms. Your healthcare provider may have other tips.  · Rest the painful joint as much as you can.  · Raise the painful joint so it is at a level higher than your heart.  · Use ice for 10 minutes every 1-2 hours as possible.  How can I prevent gout?  With a little effort, you may be able to prevent gout attacks in the future. Here are some things you can do:  · Avoid foods high in purines  ¨ Certain meats (red meat, processed meat, turkey)  ¨ Organ meats (kidney, liver, sweetbread)  ¨ Shellfish (lobster, crab, shrimp, scallop, mussel)  ¨ Certain fish (anchovy, sardine, herring, mackerel)  · Take any medications prescribed by your healthcare provider.  · Lose weight if you need to.  · Reduce high fructose corn syrup in meals and drinks.  · Reduce or eliminate consumption of alcohol, particularly beer, but also red wine and spirits.  · Control blood pressure, diabetes, and cholesterol.  · Drink plenty of water to help flush uric acid from your body.  Date Last Reviewed: 2/1/2016  © 7544-1587 [employer]. [address] [place] [address], [place] [address]. All rights reserved. This information is not intended as a substitute for professional medical care. Always follow your healthcare professional's instructions.

## 2019-04-03 NOTE — PATIENT INSTRUCTIONS
F/u with PCP regarding long term management of gout and High Blood pressure within the week.     Treating Gout Attacks     Raising the joint above the level of your heart can help reduce gout symptoms.     Gout is a disease that affects the joints. It is caused by excess uric acid in your blood stream that may lead to crystals forming in your joints. Left untreated, it can lead to painful foot and joint deformities and even kidney problems. But, by treating gout early, you can relieve pain and help prevent future problems. Gout can usually be treated with medication and proper diet. In severe cases, surgery may be needed.  Gout attacks are painful and often happen more than once. Taking medications may reduce pain and prevent attacks in the future. There are also some things you can do at home to relieve symptoms.  Medications for gout  Your healthcare provider may prescribe a daily medication to reduce levels of uric acid. Reducing your uric acid levels may help prevent gout attacks. Allopurinol is one commonly used medication taken daily to reduce uric acid levels. Other medications can help relieve pain and swelling during an acute attack. Medicines such as NSAIDs (nonsteroidal anti-inflammatory medicines), steroids, and colchicine may be prescribed for intermittent use to relieve an acute gout attack. Be sure to take your medication as directed.  What you can do  Below are some things you can do at home to relieve gout symptoms. Your healthcare provider may have other tips.  · Rest the painful joint as much as you can.  · Raise the painful joint so it is at a level higher than your heart.  · Use ice for 10 minutes every 1-2 hours as possible.  How can I prevent gout?  With a little effort, you may be able to prevent gout attacks in the future. Here are some things you can do:  · Avoid foods high in purines  ¨ Certain meats (red meat, processed meat, turkey)  ¨ Organ meats (kidney, liver,  sweetbread)  ¨ Shellfish (lobster, crab, shrimp, scallop, mussel)  ¨ Certain fish (anchovy, sardine, herring, mackerel)  · Take any medications prescribed by your healthcare provider.  · Lose weight if you need to.  · Reduce high fructose corn syrup in meals and drinks.  · Reduce or eliminate consumption of alcohol, particularly beer, but also red wine and spirits.  · Control blood pressure, diabetes, and cholesterol.  · Drink plenty of water to help flush uric acid from your body.  Date Last Reviewed: 2/1/2016  © 5210-8766 [employer]. [address] [place] [address], [place] [address]. All rights reserved. This information is not intended as a substitute for professional medical care. Always follow your healthcare professional's instructions.

## 2019-06-12 ENCOUNTER — OFFICE VISIT (OUTPATIENT)
Dept: URGENT CARE | Facility: CLINIC | Age: 44
End: 2019-06-12
Payer: COMMERCIAL

## 2019-06-12 VITALS
RESPIRATION RATE: 18 BRPM | WEIGHT: 280 LBS | OXYGEN SATURATION: 95 % | TEMPERATURE: 98 F | HEART RATE: 100 BPM | SYSTOLIC BLOOD PRESSURE: 146 MMHG | HEIGHT: 68 IN | BODY MASS INDEX: 42.44 KG/M2 | DIASTOLIC BLOOD PRESSURE: 91 MMHG

## 2019-06-12 DIAGNOSIS — B96.89 BACTERIAL SINUSITIS: Primary | ICD-10-CM

## 2019-06-12 DIAGNOSIS — R05.9 COUGH: ICD-10-CM

## 2019-06-12 DIAGNOSIS — R59.1 LYMPHADENOPATHY: ICD-10-CM

## 2019-06-12 DIAGNOSIS — J32.9 BACTERIAL SINUSITIS: Primary | ICD-10-CM

## 2019-06-12 PROCEDURE — 3077F SYST BP >= 140 MM HG: CPT | Mod: CPTII,S$GLB,, | Performed by: PHYSICIAN ASSISTANT

## 2019-06-12 PROCEDURE — 3008F PR BODY MASS INDEX (BMI) DOCUMENTED: ICD-10-PCS | Mod: CPTII,S$GLB,, | Performed by: PHYSICIAN ASSISTANT

## 2019-06-12 PROCEDURE — 99214 OFFICE O/P EST MOD 30 MIN: CPT | Mod: 25,S$GLB,, | Performed by: PHYSICIAN ASSISTANT

## 2019-06-12 PROCEDURE — 3008F BODY MASS INDEX DOCD: CPT | Mod: CPTII,S$GLB,, | Performed by: PHYSICIAN ASSISTANT

## 2019-06-12 PROCEDURE — 3080F PR MOST RECENT DIASTOLIC BLOOD PRESSURE >= 90 MM HG: ICD-10-PCS | Mod: CPTII,S$GLB,, | Performed by: PHYSICIAN ASSISTANT

## 2019-06-12 PROCEDURE — 99214 PR OFFICE/OUTPT VISIT, EST, LEVL IV, 30-39 MIN: ICD-10-PCS | Mod: 25,S$GLB,, | Performed by: PHYSICIAN ASSISTANT

## 2019-06-12 PROCEDURE — 3077F PR MOST RECENT SYSTOLIC BLOOD PRESSURE >= 140 MM HG: ICD-10-PCS | Mod: CPTII,S$GLB,, | Performed by: PHYSICIAN ASSISTANT

## 2019-06-12 PROCEDURE — 96372 THER/PROPH/DIAG INJ SC/IM: CPT | Mod: S$GLB,,, | Performed by: PHYSICIAN ASSISTANT

## 2019-06-12 PROCEDURE — 96372 PR INJECTION,THERAP/PROPH/DIAG2ST, IM OR SUBCUT: ICD-10-PCS | Mod: S$GLB,,, | Performed by: PHYSICIAN ASSISTANT

## 2019-06-12 PROCEDURE — 3080F DIAST BP >= 90 MM HG: CPT | Mod: CPTII,S$GLB,, | Performed by: PHYSICIAN ASSISTANT

## 2019-06-12 RX ORDER — CODEINE PHOSPHATE AND GUAIFENESIN 10; 100 MG/5ML; MG/5ML
5 SOLUTION ORAL 3 TIMES DAILY PRN
Qty: 120 ML | Refills: 0 | Status: SHIPPED | OUTPATIENT
Start: 2019-06-12 | End: 2019-06-12 | Stop reason: CLARIF

## 2019-06-12 RX ORDER — BETAMETHASONE SODIUM PHOSPHATE AND BETAMETHASONE ACETATE 3; 3 MG/ML; MG/ML
9 INJECTION, SUSPENSION INTRA-ARTICULAR; INTRALESIONAL; INTRAMUSCULAR; SOFT TISSUE
Status: COMPLETED | OUTPATIENT
Start: 2019-06-12 | End: 2019-06-12

## 2019-06-12 RX ORDER — AMLODIPINE BESYLATE AND ATORVASTATIN CALCIUM 10; 20 MG/1; MG/1
1 TABLET, FILM COATED ORAL DAILY
COMMUNITY
End: 2021-06-08 | Stop reason: SDUPTHER

## 2019-06-12 RX ORDER — CODEINE PHOSPHATE AND GUAIFENESIN 10; 100 MG/5ML; MG/5ML
5 SOLUTION ORAL 3 TIMES DAILY PRN
Qty: 120 ML | Refills: 0 | Status: SHIPPED | OUTPATIENT
Start: 2019-06-12 | End: 2019-06-22

## 2019-06-12 RX ORDER — BENZONATATE 100 MG/1
200 CAPSULE ORAL 3 TIMES DAILY PRN
Qty: 30 CAPSULE | Refills: 1 | Status: SHIPPED | OUTPATIENT
Start: 2019-06-12 | End: 2019-06-22

## 2019-06-12 RX ORDER — ALLOPURINOL 100 MG/1
100 TABLET ORAL DAILY
COMMUNITY
End: 2021-06-08

## 2019-06-12 RX ORDER — AMOXICILLIN AND CLAVULANATE POTASSIUM 875; 125 MG/1; MG/1
1 TABLET, FILM COATED ORAL 2 TIMES DAILY
Qty: 20 TABLET | Refills: 0 | Status: SHIPPED | OUTPATIENT
Start: 2019-06-12 | End: 2019-06-22

## 2019-06-12 RX ADMIN — BETAMETHASONE SODIUM PHOSPHATE AND BETAMETHASONE ACETATE 9 MG: 3; 3 INJECTION, SUSPENSION INTRA-ARTICULAR; INTRALESIONAL; INTRAMUSCULAR; SOFT TISSUE at 01:06

## 2019-06-12 NOTE — PROGRESS NOTES
"Subjective:       Patient ID: Mauricio Fernandes is a 43 y.o. male.    Vitals:  height is 5' 8" (1.727 m) and weight is 127 kg (280 lb). His temperature is 98.2 °F (36.8 °C). His blood pressure is 146/91 (abnormal) and his pulse is 100. His respiration is 18 and oxygen saturation is 95%.     Chief Complaint: Sinus Problem    Sinus Problem   This is a new problem. The current episode started 1 to 4 weeks ago. The problem has been gradually worsening since onset. There has been no fever. His pain is at a severity of 6/10. The pain is moderate. Associated symptoms include congestion, coughing, headaches and sinus pressure. Pertinent negatives include no chills, diaphoresis, ear pain, hoarse voice, neck pain, shortness of breath, sneezing, sore throat or swollen glands. Treatments tried: NSAIDs. The treatment provided mild relief.       Constitution: Negative for chills, sweating, fatigue and fever.   HENT: Positive for congestion, sinus pain and sinus pressure. Negative for ear pain, nosebleeds, foreign body in nose, postnasal drip, sore throat, trouble swallowing and voice change.    Neck: Negative for neck pain, neck stiffness, painful lymph nodes and neck swelling.   Cardiovascular: Negative for chest pain, leg swelling, palpitations, sob on exertion and passing out.   Eyes: Negative for eye pain, eye redness, photophobia, double vision and blurred vision.   Respiratory: Positive for cough. Negative for chest tightness, sputum production, bloody sputum, shortness of breath, stridor and wheezing.    Gastrointestinal: Negative for abdominal pain, abdominal bloating, nausea, vomiting, constipation, diarrhea and heartburn.   Musculoskeletal: Negative for joint pain, joint swelling, back pain, pain with walking, muscle cramps and muscle ache.   Skin: Negative for rash.   Allergic/Immunologic: Positive for recurrent sinus infections. Negative for seasonal allergies and sneezing.   Neurological: Positive for headaches. " Negative for dizziness, light-headedness, passing out, loss of balance, altered mental status, loss of consciousness and seizures.   Hematologic/Lymphatic: Negative for swollen lymph nodes.   Psychiatric/Behavioral: Negative for altered mental status and nervous/anxious. The patient is not nervous/anxious.        Objective:      Physical Exam   Constitutional: He is oriented to person, place, and time. He appears well-developed and well-nourished. He is cooperative.  Non-toxic appearance. He does not appear ill. No distress.   HENT:   Head: Normocephalic and atraumatic.   Right Ear: Hearing, tympanic membrane, external ear and ear canal normal.   Left Ear: Hearing, tympanic membrane, external ear and ear canal normal.   Nose: Mucosal edema and rhinorrhea present. No nasal deformity. No epistaxis. Right sinus exhibits maxillary sinus tenderness and frontal sinus tenderness. Left sinus exhibits maxillary sinus tenderness and frontal sinus tenderness.   Mouth/Throat: Uvula is midline and mucous membranes are normal. No trismus in the jaw. Normal dentition. No uvula swelling. Posterior oropharyngeal erythema present. No oropharyngeal exudate or posterior oropharyngeal edema.   Eyes: Pupils are equal, round, and reactive to light. Conjunctivae, EOM and lids are normal. Right eye exhibits no discharge. Left eye exhibits no discharge. No scleral icterus.   Sclera clear bilat   Neck: Trachea normal, normal range of motion, full passive range of motion without pain and phonation normal. Neck supple.   Cardiovascular: Normal rate, regular rhythm, normal heart sounds, intact distal pulses and normal pulses.   Pulmonary/Chest: Effort normal and breath sounds normal. No accessory muscle usage or stridor. No respiratory distress. He has no decreased breath sounds. He has no wheezes. He has no rhonchi. He has no rales.   Abdominal: Soft. Normal appearance and bowel sounds are normal. He exhibits no distension, no pulsatile midline  mass and no mass. There is no tenderness.   Musculoskeletal: Normal range of motion. He exhibits no edema or deformity.   Lymphadenopathy:     He has cervical adenopathy.        Right cervical: Superficial cervical adenopathy present.        Left cervical: Superficial cervical adenopathy present.   Neurological: He is alert and oriented to person, place, and time. He has normal strength and normal reflexes. No cranial nerve deficit or sensory deficit. He exhibits normal muscle tone. He displays a negative Romberg sign. Coordination normal. GCS eye subscore is 4. GCS verbal subscore is 5. GCS motor subscore is 6.   Skin: Skin is warm, dry and intact. Capillary refill takes less than 2 seconds. No rash noted. He is not diaphoretic. No pallor.   Psychiatric: He has a normal mood and affect. His speech is normal and behavior is normal. Judgment and thought content normal. Cognition and memory are normal.   Nursing note and vitals reviewed.      Assessment:       1. Bacterial sinusitis    2. Cough    3. Lymphadenopathy        Plan:         Bacterial sinusitis  -     amoxicillin-clavulanate 875-125mg (AUGMENTIN) 875-125 mg per tablet; Take 1 tablet by mouth 2 (two) times daily. for 10 days  Dispense: 20 tablet; Refill: 0    Cough  -     benzonatate (TESSALON PERLES) 100 MG capsule; Take 2 capsules (200 mg total) by mouth 3 (three) times daily as needed for Cough.  Dispense: 30 capsule; Refill: 1  -     guaifenesin-codeine 100-10 mg/5 ml (TUSSI-ORGANIDIN NR)  mg/5 mL syrup; Take 5 mLs by mouth 3 (three) times daily as needed for Cough.  Dispense: 120 mL; Refill: 0    Lymphadenopathy  -     betamethasone acetate-betamethasone sodium phosphate injection 9 mg    Other orders  -     Discontinue: guaifenesin-codeine 100-10 mg/5 ml (TUSSI-ORGANIDIN NR)  mg/5 mL syrup; Take 5 mLs by mouth 3 (three) times daily as needed for Cough.  Dispense: 120 mL; Refill: 0      Patient Instructions   If you were prescribed a  narcotic or controlled medication, do not drive or operate heavy equipment or machinery while taking these medications.  You must understand that you've received an Urgent Care treatment only and that you may be released before all your medical problems are known or treated. You, the patient, will arrange for follow up care as instructed.  Follow up with your PCP or specialty clinic as directed if not improved or as needed. You can call [phone] to schedule an appointment with the appropriate provider.  If your condition worsens we recommend that you receive another evaluation at the Emergency Department for any concerns or worsening of condition.  Patient aware and verbalized understanding.    You received a steroid shot today - this can elevate your blood pressure, elevate your blood sugar, water weight gain, nervous energy, redness to the face and dimpling of the skin where the shot goes in.     Increase fluids and rest is important.  Avoid contact with sick individuals.  Humidifier use at home.  Augmentin RX as prescribed for sinusitis.  Tessalon Perles RX as prescribed for daytime cough.  Cough Syrup RX as prescribed for nighttime cough - will make you drowsy.  OTC Claritin or Zyrtec daily as directed.  Flonase Nasal [place] as directed for nasal congestion.  OTC Tylenol or Motrin every 4 - 6 hours as needed for fever or pain.  Follow-up with your PCP in the next 48hrs or sooner for re-evaluation especially if no improvement in symptoms.  ER precautions given to patient.  Patient aware and verbalized understanding.    Sinusitis (Antibiotic Treatment)    The sinuses are air-filled spaces within the bones of the face. They connect to the inside of the nose. Sinusitis is an inflammation of the tissue lining the sinus cavity. Sinus inflammation can occur during a cold. It can also be due to allergies to pollens and other particles in the air. Sinusitis can cause symptoms of sinus congestion and fullness. A  sinus infection causes fever, headache and facial pain. There is often green or yellow drainage from the nose or into the back of the throat (post-nasal drip). You have been given antibiotics to treat this condition.  Home care:  · Take the full course of antibiotics as instructed. Do not stop taking them, even if you feel better.  · Drink plenty of water, hot tea, and other liquids. This may help thin mucus. It also may promote sinus drainage.  · Heat may help soothe painful areas of the face. Use a towel soaked in hot water. Or,  the shower and direct the hot spray onto your face. Using a vaporizer along with a menthol rub at night may also help.   · An expectorant containing guaifenesin may help thin the mucus and promote drainage from the sinuses.  · Over-the-counter decongestants may be used unless a similar medicine was prescribed. Nasal sprays work the fastest. Use one that contains phenylephrine or oxymetazoline. First blow the nose gently. Then use the spray. Do not use these medicines more often than directed on the label or symptoms may get worse. You may also use tablets containing pseudoephedrine. Avoid products that combine ingredients, because side effects may be increased. Read labels. You can also ask the pharmacist for help. (NOTE: Persons with high blood pressure should not use decongestants. They can raise blood pressure.)  · Over-the-counter antihistamines may help if allergies contributed to your sinusitis.    · Do not use nasal rinses or irrigation during an acute sinus infection, unless told to by your health care provider. Rinsing may spread the infection to other sinuses.  · Use acetaminophen or ibuprofen to control pain, unless another pain medicine was prescribed. (If you have chronic liver or kidney disease or ever had a stomach ulcer, talk with your doctor before using these medicines. Aspirin should never be used in anyone under 18 years of age who is ill with a fever. It may  cause severe liver damage.)  · Don't smoke. This can worsen symptoms.  Follow-up care  Follow up with your healthcare provider or our staff if you are not improving within the next week.  When to seek medical advice  Call your healthcare provider if any of these occur:  · Facial pain or headache becoming more severe  · Stiff neck  · Unusual drowsiness or confusion  · Swelling of the forehead or eyelids  · Vision problems, including blurred or double vision  · Fever of 100.4ºF (38ºC) or higher, or as directed by your healthcare provider  · Seizure  · Breathing problems  · Symptoms not resolving within 10 days  Date Last Reviewed: 4/13/2015  © 3807-6892 [employer]. [address] [place] [address], [place] [address]. All rights reserved. This information is not intended as a substitute for professional medical care. Always follow your healthcare professional's instructions.

## 2019-06-12 NOTE — PATIENT INSTRUCTIONS
If you were prescribed a narcotic or controlled medication, do not drive or operate heavy equipment or machinery while taking these medications.  You must understand that you've received an Urgent Care treatment only and that you may be released before all your medical problems are known or treated. You, the patient, will arrange for follow up care as instructed.  Follow up with your PCP or specialty clinic as directed if not improved or as needed. You can call [phone] to schedule an appointment with the appropriate provider.  If your condition worsens we recommend that you receive another evaluation at the Emergency Department for any concerns or worsening of condition.  Patient aware and verbalized understanding.    You received a steroid shot today - this can elevate your blood pressure, elevate your blood sugar, water weight gain, nervous energy, redness to the face and dimpling of the skin where the shot goes in.     Increase fluids and rest is important.  Avoid contact with sick individuals.  Humidifier use at home.  Augmentin RX as prescribed for sinusitis.  Tessalon Perles RX as prescribed for daytime cough.  Cough Syrup RX as prescribed for nighttime cough - will make you drowsy.  OTC Claritin or Zyrtec daily as directed.  Flonase Nasal [place] as directed for nasal congestion.  OTC Tylenol or Motrin every 4 - 6 hours as needed for fever or pain.  Follow-up with your PCP in the next 48hrs or sooner for re-evaluation especially if no improvement in symptoms.  ER precautions given to patient.  Patient aware and verbalized understanding.    Sinusitis (Antibiotic Treatment)    The sinuses are air-filled spaces within the bones of the face. They connect to the inside of the nose. Sinusitis is an inflammation of the tissue lining the sinus cavity. Sinus inflammation can occur during a cold. It can also be due to allergies to pollens and other particles in the air. Sinusitis can cause symptoms of sinus  congestion and fullness. A sinus infection causes fever, headache and facial pain. There is often green or yellow drainage from the nose or into the back of the throat (post-nasal drip). You have been given antibiotics to treat this condition.  Home care:  · Take the full course of antibiotics as instructed. Do not stop taking them, even if you feel better.  · Drink plenty of water, hot tea, and other liquids. This may help thin mucus. It also may promote sinus drainage.  · Heat may help soothe painful areas of the face. Use a towel soaked in hot water. Or,  the shower and direct the hot spray onto your face. Using a vaporizer along with a menthol rub at night may also help.   · An expectorant containing guaifenesin may help thin the mucus and promote drainage from the sinuses.  · Over-the-counter decongestants may be used unless a similar medicine was prescribed. Nasal sprays work the fastest. Use one that contains phenylephrine or oxymetazoline. First blow the nose gently. Then use the spray. Do not use these medicines more often than directed on the label or symptoms may get worse. You may also use tablets containing pseudoephedrine. Avoid products that combine ingredients, because side effects may be increased. Read labels. You can also ask the pharmacist for help. (NOTE: Persons with high blood pressure should not use decongestants. They can raise blood pressure.)  · Over-the-counter antihistamines may help if allergies contributed to your sinusitis.    · Do not use nasal rinses or irrigation during an acute sinus infection, unless told to by your health care provider. Rinsing may spread the infection to other sinuses.  · Use acetaminophen or ibuprofen to control pain, unless another pain medicine was prescribed. (If you have chronic liver or kidney disease or ever had a stomach ulcer, talk with your doctor before using these medicines. Aspirin should never be used in anyone under 18 years of age who is  ill with a fever. It may cause severe liver damage.)  · Don't smoke. This can worsen symptoms.  Follow-up care  Follow up with your healthcare provider or our staff if you are not improving within the next week.  When to seek medical advice  Call your healthcare provider if any of these occur:  · Facial pain or headache becoming more severe  · Stiff neck  · Unusual drowsiness or confusion  · Swelling of the forehead or eyelids  · Vision problems, including blurred or double vision  · Fever of 100.4ºF (38ºC) or higher, or as directed by your healthcare provider  · Seizure  · Breathing problems  · Symptoms not resolving within 10 days  Date Last Reviewed: 4/13/2015  © 9300-4646 [employer]. [address] [place] [address], [place] [address]. All rights reserved. This information is not intended as a substitute for professional medical care. Always follow your healthcare professional's instructions.

## 2019-06-12 NOTE — LETTER
June 12, 2019      [facility]  [place] [address]  Phone: [phone]  Fax: [phone]       Patient: Mauricio Fernandes   YOB: 1975  Date of Visit: 06/12/2019    To Whom It May Concern:    Kaylah Fernandes  was at [facility] on 06/12/2019. He may return to work/school on 06/14/2019 with no restrictions. If you have any questions or concerns, or if I can be of further assistance, please do not hesitate to contact me.    Sincerely,        Leonie Alarcon PA-C

## 2020-06-10 ENCOUNTER — OFFICE VISIT (OUTPATIENT)
Dept: URGENT CARE | Facility: CLINIC | Age: 45
End: 2020-06-10
Payer: COMMERCIAL

## 2020-06-10 VITALS
HEIGHT: 68 IN | WEIGHT: 235 LBS | BODY MASS INDEX: 35.61 KG/M2 | DIASTOLIC BLOOD PRESSURE: 92 MMHG | TEMPERATURE: 99 F | OXYGEN SATURATION: 98 % | HEART RATE: 88 BPM | SYSTOLIC BLOOD PRESSURE: 165 MMHG

## 2020-06-10 DIAGNOSIS — R42 DIZZINESS AND GIDDINESS: Primary | ICD-10-CM

## 2020-06-10 PROCEDURE — 99213 OFFICE O/P EST LOW 20 MIN: CPT | Mod: S$GLB,,, | Performed by: FAMILY MEDICINE

## 2020-06-10 PROCEDURE — 99213 PR OFFICE/OUTPT VISIT, EST, LEVL III, 20-29 MIN: ICD-10-PCS | Mod: S$GLB,,, | Performed by: FAMILY MEDICINE

## 2020-06-10 RX ORDER — MECLIZINE HYDROCHLORIDE 25 MG/1
25 TABLET ORAL 3 TIMES DAILY PRN
Qty: 30 TABLET | Refills: 0 | Status: SHIPPED | OUTPATIENT
Start: 2020-06-10 | End: 2021-06-08

## 2020-06-10 NOTE — PROGRESS NOTES
"Subjective:       Patient ID: Mauricio Fernandes is a 44 y.o. male.    Vitals:  height is 5' 8" (1.727 m) and weight is 106.6 kg (235 lb). His tympanic temperature is 98.7 °F (37.1 °C). His blood pressure is 165/92 (abnormal) and his pulse is 88. His oxygen saturation is 98%.     Chief Complaint: Dizziness    Dizziness:   Chronicity:  New  Onset:  Yesterday  Progression since onset:  Resolved, then recurrent (Has noticed it is hwne he wakes up and then feels better after he eats)  Pain Scale:  0/10  Duration:  1 hour  Dizziness characteristics:  Lightheaded/impending faintno fever, no headaches, no nausea, no vomiting, no light-headedness and no chest pain.  Exacerbated by: Laying down for extended periods of time.  Treatments tried:  Nothing (No txs tried but states he feels better after eating)      Constitution: Negative for chills, fatigue and fever.   HENT: Negative for congestion and sore throat.    Neck: Negative for painful lymph nodes.   Cardiovascular: Negative for chest pain and leg swelling.   Eyes: Negative for double vision and blurred vision.   Respiratory: Negative for cough and shortness of breath.    Gastrointestinal: Negative for nausea, vomiting and diarrhea.   Genitourinary: Negative for dysuria, frequency and urgency.   Musculoskeletal: Negative for joint pain, joint swelling, muscle cramps and muscle ache.   Skin: Negative for color change, pale and rash.   Allergic/Immunologic: Negative for seasonal allergies.   Neurological: Positive for dizziness. Negative for history of vertigo, light-headedness, passing out and headaches.   Hematologic/Lymphatic: Negative for swollen lymph nodes, easy bruising/bleeding and history of blood clots. Does not bruise/bleed easily.   Psychiatric/Behavioral: Negative for nervous/anxious, sleep disturbance and depression. The patient is not nervous/anxious.        Objective:      Physical Exam   Constitutional: He is oriented to person, place, and time. He appears " well-developed and well-nourished. He is cooperative.  Non-toxic appearance. He does not appear ill. No distress.   HENT:   Head: Normocephalic and atraumatic.   Right Ear: Hearing, tympanic membrane, external ear and ear canal normal.   Left Ear: Hearing, tympanic membrane, external ear and ear canal normal.   Nose: Nose normal. No mucosal edema, rhinorrhea or nasal deformity. No epistaxis. Right sinus exhibits no maxillary sinus tenderness and no frontal sinus tenderness. Left sinus exhibits no maxillary sinus tenderness and no frontal sinus tenderness.   Mouth/Throat: Uvula is midline, oropharynx is clear and moist and mucous membranes are normal. No trismus in the jaw. Normal dentition. No uvula swelling. No posterior oropharyngeal erythema.   Eyes: Conjunctivae and lids are normal. Right eye exhibits no discharge. Left eye exhibits no discharge. No scleral icterus.   Neck: Trachea normal, normal range of motion, full passive range of motion without pain and phonation normal. Neck supple.   Cardiovascular: Normal rate, regular rhythm, normal heart sounds, intact distal pulses and normal pulses.   Pulmonary/Chest: Effort normal and breath sounds normal. No respiratory distress.   Abdominal: Soft. Normal appearance and bowel sounds are normal. He exhibits no distension, no pulsatile midline mass and no mass. There is no tenderness.   Musculoskeletal: Normal range of motion. He exhibits no edema or deformity.   Neurological: He is alert and oriented to person, place, and time. He exhibits normal muscle tone. Coordination normal.   Skin: Skin is warm, dry, intact, not diaphoretic and not pale.   Psychiatric: He has a normal mood and affect. His speech is normal and behavior is normal. Judgment and thought content normal. Cognition and memory are normal.   Nursing note and vitals reviewed.        Assessment:       1. Dizziness and giddiness        Plan:         Dizziness and giddiness  -     meclizine (ANTIVERT) 25  mg tablet; Take 1 tablet (25 mg total) by mouth 3 (three) times daily as needed for Dizziness.  Dispense: 30 tablet; Refill: 0

## 2021-05-27 ENCOUNTER — OFFICE VISIT (OUTPATIENT)
Dept: URGENT CARE | Facility: CLINIC | Age: 46
End: 2021-05-27
Payer: COMMERCIAL

## 2021-05-27 VITALS
RESPIRATION RATE: 18 BRPM | WEIGHT: 245 LBS | HEIGHT: 68 IN | DIASTOLIC BLOOD PRESSURE: 76 MMHG | OXYGEN SATURATION: 96 % | SYSTOLIC BLOOD PRESSURE: 143 MMHG | TEMPERATURE: 98 F | BODY MASS INDEX: 37.13 KG/M2 | HEART RATE: 44 BPM

## 2021-05-27 DIAGNOSIS — J30.9 ALLERGIC RHINITIS, UNSPECIFIED SEASONALITY, UNSPECIFIED TRIGGER: ICD-10-CM

## 2021-05-27 DIAGNOSIS — R00.1 BRADYCARDIA: Primary | ICD-10-CM

## 2021-05-27 PROCEDURE — 99214 PR OFFICE/OUTPT VISIT, EST, LEVL IV, 30-39 MIN: ICD-10-PCS | Mod: S$GLB,,, | Performed by: NURSE PRACTITIONER

## 2021-05-27 PROCEDURE — 93005 EKG 12-LEAD: ICD-10-PCS | Mod: S$GLB,,, | Performed by: NURSE PRACTITIONER

## 2021-05-27 PROCEDURE — 93005 ELECTROCARDIOGRAM TRACING: CPT | Mod: S$GLB,,, | Performed by: NURSE PRACTITIONER

## 2021-05-27 PROCEDURE — 93010 ELECTROCARDIOGRAM REPORT: CPT | Mod: S$GLB,,, | Performed by: INTERNAL MEDICINE

## 2021-05-27 PROCEDURE — 3008F PR BODY MASS INDEX (BMI) DOCUMENTED: ICD-10-PCS | Mod: CPTII,S$GLB,, | Performed by: NURSE PRACTITIONER

## 2021-05-27 PROCEDURE — 93010 EKG 12-LEAD: ICD-10-PCS | Mod: S$GLB,,, | Performed by: INTERNAL MEDICINE

## 2021-05-27 PROCEDURE — 99214 OFFICE O/P EST MOD 30 MIN: CPT | Mod: S$GLB,,, | Performed by: NURSE PRACTITIONER

## 2021-05-27 PROCEDURE — 3008F BODY MASS INDEX DOCD: CPT | Mod: CPTII,S$GLB,, | Performed by: NURSE PRACTITIONER

## 2021-05-27 RX ORDER — FLUTICASONE PROPIONATE 50 MCG
2 SPRAY, SUSPENSION (ML) NASAL DAILY
Qty: 9.9 ML | Refills: 0 | Status: SHIPPED | OUTPATIENT
Start: 2021-05-27 | End: 2021-06-23

## 2021-05-27 RX ORDER — AZELASTINE 1 MG/ML
1 SPRAY, METERED NASAL 2 TIMES DAILY
Qty: 30 ML | Refills: 0 | Status: SHIPPED | OUTPATIENT
Start: 2021-05-27 | End: 2021-12-01

## 2021-06-08 ENCOUNTER — OFFICE VISIT (OUTPATIENT)
Dept: CARDIOLOGY | Facility: CLINIC | Age: 46
End: 2021-06-08
Payer: COMMERCIAL

## 2021-06-08 VITALS
DIASTOLIC BLOOD PRESSURE: 90 MMHG | BODY MASS INDEX: 38.04 KG/M2 | HEIGHT: 68 IN | HEART RATE: 46 BPM | OXYGEN SATURATION: 98 % | WEIGHT: 251 LBS | SYSTOLIC BLOOD PRESSURE: 170 MMHG

## 2021-06-08 DIAGNOSIS — R00.1 BRADYCARDIA: ICD-10-CM

## 2021-06-08 DIAGNOSIS — E66.01 MORBID OBESITY: Chronic | ICD-10-CM

## 2021-06-08 DIAGNOSIS — I49.3 PVC'S (PREMATURE VENTRICULAR CONTRACTIONS): Primary | ICD-10-CM

## 2021-06-08 PROCEDURE — 99999 PR PBB SHADOW E&M-EST. PATIENT-LVL III: ICD-10-PCS | Mod: PBBFAC,,, | Performed by: INTERNAL MEDICINE

## 2021-06-08 PROCEDURE — 1126F AMNT PAIN NOTED NONE PRSNT: CPT | Mod: S$GLB,,, | Performed by: INTERNAL MEDICINE

## 2021-06-08 PROCEDURE — 99204 PR OFFICE/OUTPT VISIT, NEW, LEVL IV, 45-59 MIN: ICD-10-PCS | Mod: S$GLB,,, | Performed by: INTERNAL MEDICINE

## 2021-06-08 PROCEDURE — 3008F BODY MASS INDEX DOCD: CPT | Mod: CPTII,S$GLB,, | Performed by: INTERNAL MEDICINE

## 2021-06-08 PROCEDURE — 1126F PR PAIN SEVERITY QUANTIFIED, NO PAIN PRESENT: ICD-10-PCS | Mod: S$GLB,,, | Performed by: INTERNAL MEDICINE

## 2021-06-08 PROCEDURE — 99999 PR PBB SHADOW E&M-EST. PATIENT-LVL III: CPT | Mod: PBBFAC,,, | Performed by: INTERNAL MEDICINE

## 2021-06-08 PROCEDURE — 3008F PR BODY MASS INDEX (BMI) DOCUMENTED: ICD-10-PCS | Mod: CPTII,S$GLB,, | Performed by: INTERNAL MEDICINE

## 2021-06-08 PROCEDURE — 99204 OFFICE O/P NEW MOD 45 MIN: CPT | Mod: S$GLB,,, | Performed by: INTERNAL MEDICINE

## 2021-06-08 RX ORDER — AMLODIPINE BESYLATE AND ATORVASTATIN CALCIUM 10; 20 MG/1; MG/1
1 TABLET, FILM COATED ORAL DAILY
Qty: 30 TABLET | Refills: 11 | Status: SHIPPED | OUTPATIENT
Start: 2021-06-08 | End: 2021-06-23

## 2021-06-14 ENCOUNTER — HOSPITAL ENCOUNTER (OUTPATIENT)
Dept: CARDIOLOGY | Facility: HOSPITAL | Age: 46
Discharge: HOME | End: 2021-06-14
Attending: INTERNAL MEDICINE
Payer: COMMERCIAL

## 2021-06-14 VITALS — BODY MASS INDEX: 38.04 KG/M2 | WEIGHT: 251 LBS | HEIGHT: 68 IN

## 2021-06-14 DIAGNOSIS — R00.1 BRADYCARDIA: ICD-10-CM

## 2021-06-14 DIAGNOSIS — I49.3 PVC'S (PREMATURE VENTRICULAR CONTRACTIONS): ICD-10-CM

## 2021-06-14 LAB
AORTIC ROOT ANNULUS: 3.5 CM
ASCENDING AORTA: 2.86 CM
AV INDEX (PROSTH): 0.81
AV MEAN GRADIENT: 4 MMHG
AV PEAK GRADIENT: 7 MMHG
AV VALVE AREA: 3.5 CM2
AV VELOCITY RATIO: 0.94
BSA FOR ECHO PROCEDURE: 2.34 M2
CV ECHO LV RWT: 0.48 CM
CV STRESS BASE HR: 81 BPM
DIASTOLIC BLOOD PRESSURE: 113 MMHG
DOP CALC AO PEAK VEL: 1.34 M/S
DOP CALC AO VTI: 27.86 CM
DOP CALC LVOT AREA: 4.3 CM2
DOP CALC LVOT DIAMETER: 2.34 CM
DOP CALC LVOT PEAK VEL: 1.26 M/S
DOP CALC LVOT STROKE VOLUME: 97.44 CM3
DOP CALCLVOT PEAK VEL VTI: 22.67 CM
E WAVE DECELERATION TIME: 160.83 MSEC
E/A RATIO: 1.7
E/E' RATIO: 10.5 M/S
ECHO LV POSTERIOR WALL: 1.26 CM (ref 0.6–1.1)
EJECTION FRACTION: 55 %
FRACTIONAL SHORTENING: 30 % (ref 28–44)
INTERVENTRICULAR SEPTUM: 1.27 CM (ref 0.6–1.1)
LA MAJOR: 6.44 CM
LA MINOR: 6.37 CM
LA WIDTH: 4.32 CM
LEFT ATRIUM SIZE: 4.66 CM
LEFT ATRIUM VOLUME INDEX MOD: 34.5 ML/M2
LEFT ATRIUM VOLUME INDEX: 48.7 ML/M2
LEFT ATRIUM VOLUME MOD: 77.59 CM3
LEFT ATRIUM VOLUME: 109.6 CM3
LEFT INTERNAL DIMENSION IN SYSTOLE: 3.7 CM (ref 2.1–4)
LEFT VENTRICLE DIASTOLIC VOLUME INDEX: 58.99 ML/M2
LEFT VENTRICLE DIASTOLIC VOLUME: 132.73 ML
LEFT VENTRICLE MASS INDEX: 121 G/M2
LEFT VENTRICLE SYSTOLIC VOLUME INDEX: 25.8 ML/M2
LEFT VENTRICLE SYSTOLIC VOLUME: 58.06 ML
LEFT VENTRICULAR INTERNAL DIMENSION IN DIASTOLE: 5.26 CM (ref 3.5–6)
LEFT VENTRICULAR MASS: 272.83 G
LV LATERAL E/E' RATIO: 9 M/S
LV SEPTAL E/E' RATIO: 12.6 M/S
MV MEAN GRADIENT: 0 MMHG
MV PEAK A VEL: 0.37 M/S
MV PEAK E VEL: 0.63 M/S
MV PEAK GRADIENT: 2 MMHG
MV STENOSIS PRESSURE HALF TIME: 46.64 MS
MV VALVE AREA P 1/2 METHOD: 4.72 CM2
OHS CV CPX 1 MINUTE RECOVERY HEART RATE: 148 BPM
OHS CV CPX 85 PERCENT MAX PREDICTED HEART RATE MALE: 147
OHS CV CPX ESTIMATED METS: 10
OHS CV CPX MAX PREDICTED HEART RATE: 173
OHS CV CPX PATIENT IS FEMALE: 0
OHS CV CPX PATIENT IS MALE: 1
OHS CV CPX PEAK DIASTOLIC BLOOD PRESSURE: 96 MMHG
OHS CV CPX PEAK HEAR RATE: 162 BPM
OHS CV CPX PEAK RATE PRESSURE PRODUCT: ABNORMAL
OHS CV CPX PEAK SYSTOLIC BLOOD PRESSURE: 209 MMHG
OHS CV CPX PERCENT MAX PREDICTED HEART RATE ACHIEVED: 94
OHS CV CPX RATE PRESSURE PRODUCT PRESENTING: ABNORMAL
PISA TR MAX VEL: 2.79 M/S
PV PEAK VELOCITY: 0.96 CM/S
RA MAJOR: 5.57 CM
RA PRESSURE: 3 MMHG
RA WIDTH: 3.76 CM
RIGHT VENTRICULAR END-DIASTOLIC DIMENSION: 3.55 CM
STJ: 2.52 CM
STRESS ANGINA INDEX: 0
STRESS ECHO POST EXERCISE DUR MIN: 9 MINUTES
STRESS ECHO POST EXERCISE DUR SEC: 0 SECONDS
SYSTOLIC BLOOD PRESSURE: 151 MMHG
TDI LATERAL: 0.07 M/S
TDI SEPTAL: 0.05 M/S
TDI: 0.06 M/S
TR MAX PG: 31 MMHG
TRICUSPID ANNULAR PLANE SYSTOLIC EXCURSION: 2.17 CM
TV REST PULMONARY ARTERY PRESSURE: 34 MMHG

## 2021-06-14 PROCEDURE — 93325 DOPPLER ECHO COLOR FLOW MAPG: CPT | Mod: 26,,, | Performed by: INTERNAL MEDICINE

## 2021-06-14 PROCEDURE — 93351 STRESS ECHO (CUPID ONLY): ICD-10-PCS | Mod: 26,,, | Performed by: INTERNAL MEDICINE

## 2021-06-14 PROCEDURE — 93320 DOPPLER ECHO COMPLETE: CPT | Mod: 26,,, | Performed by: INTERNAL MEDICINE

## 2021-06-14 PROCEDURE — 93227 XTRNL ECG REC<48 HR R&I: CPT | Mod: ,,, | Performed by: INTERNAL MEDICINE

## 2021-06-14 PROCEDURE — 93226 XTRNL ECG REC<48 HR SCAN A/R: CPT

## 2021-06-14 PROCEDURE — 93227 HOLTER MONITOR - 48 HOUR (CUPID ONLY): ICD-10-PCS | Mod: ,,, | Performed by: INTERNAL MEDICINE

## 2021-06-14 PROCEDURE — 93320 DOPPLER ECHO COMPLETE: CPT

## 2021-06-14 PROCEDURE — 93320 STRESS ECHO (CUPID ONLY): ICD-10-PCS | Mod: 26,,, | Performed by: INTERNAL MEDICINE

## 2021-06-14 PROCEDURE — 93325 STRESS ECHO (CUPID ONLY): ICD-10-PCS | Mod: 26,,, | Performed by: INTERNAL MEDICINE

## 2021-06-14 PROCEDURE — 93351 STRESS TTE COMPLETE: CPT | Mod: 26,,, | Performed by: INTERNAL MEDICINE

## 2021-06-21 LAB
OHS CV EVENT MONITOR DAY: 0
OHS CV HOLTER LENGTH DECIMAL HOURS: 47.98
OHS CV HOLTER LENGTH HOURS: 47
OHS CV HOLTER LENGTH MINUTES: 59

## 2021-06-23 ENCOUNTER — OFFICE VISIT (OUTPATIENT)
Dept: CARDIOLOGY | Facility: CLINIC | Age: 46
End: 2021-06-23
Payer: COMMERCIAL

## 2021-06-23 VITALS
BODY MASS INDEX: 39.14 KG/M2 | HEART RATE: 53 BPM | HEIGHT: 68 IN | SYSTOLIC BLOOD PRESSURE: 130 MMHG | DIASTOLIC BLOOD PRESSURE: 68 MMHG | OXYGEN SATURATION: 98 % | WEIGHT: 258.25 LBS

## 2021-06-23 DIAGNOSIS — E66.01 MORBID OBESITY: Chronic | ICD-10-CM

## 2021-06-23 DIAGNOSIS — G47.33 OSA (OBSTRUCTIVE SLEEP APNEA): Chronic | ICD-10-CM

## 2021-06-23 DIAGNOSIS — I49.3 FREQUENT PVCS: Primary | ICD-10-CM

## 2021-06-23 DIAGNOSIS — E78.5 HYPERLIPIDEMIA, UNSPECIFIED HYPERLIPIDEMIA TYPE: ICD-10-CM

## 2021-06-23 PROCEDURE — 99999 PR PBB SHADOW E&M-EST. PATIENT-LVL III: CPT | Mod: PBBFAC,,, | Performed by: INTERNAL MEDICINE

## 2021-06-23 PROCEDURE — 99999 PR PBB SHADOW E&M-EST. PATIENT-LVL III: ICD-10-PCS | Mod: PBBFAC,,, | Performed by: INTERNAL MEDICINE

## 2021-06-23 PROCEDURE — 3008F PR BODY MASS INDEX (BMI) DOCUMENTED: ICD-10-PCS | Mod: CPTII,S$GLB,, | Performed by: INTERNAL MEDICINE

## 2021-06-23 PROCEDURE — 1126F PR PAIN SEVERITY QUANTIFIED, NO PAIN PRESENT: ICD-10-PCS | Mod: S$GLB,,, | Performed by: INTERNAL MEDICINE

## 2021-06-23 PROCEDURE — 99214 PR OFFICE/OUTPT VISIT, EST, LEVL IV, 30-39 MIN: ICD-10-PCS | Mod: S$GLB,,, | Performed by: INTERNAL MEDICINE

## 2021-06-23 PROCEDURE — 1126F AMNT PAIN NOTED NONE PRSNT: CPT | Mod: S$GLB,,, | Performed by: INTERNAL MEDICINE

## 2021-06-23 PROCEDURE — 99214 OFFICE O/P EST MOD 30 MIN: CPT | Mod: S$GLB,,, | Performed by: INTERNAL MEDICINE

## 2021-06-23 PROCEDURE — 3008F BODY MASS INDEX DOCD: CPT | Mod: CPTII,S$GLB,, | Performed by: INTERNAL MEDICINE

## 2021-06-23 RX ORDER — ATORVASTATIN CALCIUM 20 MG/1
20 TABLET, FILM COATED ORAL DAILY
Qty: 90 TABLET | Refills: 3 | Status: SHIPPED | OUTPATIENT
Start: 2021-06-23 | End: 2022-06-23

## 2021-06-23 RX ORDER — VERAPAMIL HYDROCHLORIDE 120 MG/1
120 CAPSULE, EXTENDED RELEASE ORAL DAILY
Qty: 30 CAPSULE | Refills: 11 | Status: SHIPPED | OUTPATIENT
Start: 2021-06-23 | End: 2021-10-28

## 2021-07-28 ENCOUNTER — TELEPHONE (OUTPATIENT)
Dept: CARDIOLOGY | Facility: HOSPITAL | Age: 46
End: 2021-07-28

## 2021-08-20 ENCOUNTER — OFFICE VISIT (OUTPATIENT)
Dept: CARDIOLOGY | Facility: CLINIC | Age: 46
End: 2021-08-20
Payer: COMMERCIAL

## 2021-08-20 VITALS
BODY MASS INDEX: 40.4 KG/M2 | DIASTOLIC BLOOD PRESSURE: 93 MMHG | WEIGHT: 266.56 LBS | SYSTOLIC BLOOD PRESSURE: 162 MMHG | HEART RATE: 73 BPM | HEIGHT: 68 IN

## 2021-08-20 DIAGNOSIS — I49.8 OTHER SPECIFIED CARDIAC ARRHYTHMIAS: ICD-10-CM

## 2021-08-20 DIAGNOSIS — G47.33 OSA (OBSTRUCTIVE SLEEP APNEA): Chronic | ICD-10-CM

## 2021-08-20 DIAGNOSIS — E66.01 MORBID OBESITY: Chronic | ICD-10-CM

## 2021-08-20 DIAGNOSIS — I49.3 FREQUENT PVCS: Primary | ICD-10-CM

## 2021-08-20 PROCEDURE — 3008F PR BODY MASS INDEX (BMI) DOCUMENTED: ICD-10-PCS | Mod: CPTII,S$GLB,, | Performed by: INTERNAL MEDICINE

## 2021-08-20 PROCEDURE — 93010 ELECTROCARDIOGRAM REPORT: CPT | Mod: S$GLB,,, | Performed by: INTERNAL MEDICINE

## 2021-08-20 PROCEDURE — 1159F MED LIST DOCD IN RCRD: CPT | Mod: CPTII,S$GLB,, | Performed by: INTERNAL MEDICINE

## 2021-08-20 PROCEDURE — 3080F DIAST BP >= 90 MM HG: CPT | Mod: CPTII,S$GLB,, | Performed by: INTERNAL MEDICINE

## 2021-08-20 PROCEDURE — 93005 RHYTHM STRIP: ICD-10-PCS | Mod: S$GLB,,, | Performed by: INTERNAL MEDICINE

## 2021-08-20 PROCEDURE — 99999 PR PBB SHADOW E&M-EST. PATIENT-LVL III: CPT | Mod: PBBFAC,,, | Performed by: INTERNAL MEDICINE

## 2021-08-20 PROCEDURE — 3008F BODY MASS INDEX DOCD: CPT | Mod: CPTII,S$GLB,, | Performed by: INTERNAL MEDICINE

## 2021-08-20 PROCEDURE — 1160F PR REVIEW ALL MEDS BY PRESCRIBER/CLIN PHARMACIST DOCUMENTED: ICD-10-PCS | Mod: CPTII,S$GLB,, | Performed by: INTERNAL MEDICINE

## 2021-08-20 PROCEDURE — 93005 ELECTROCARDIOGRAM TRACING: CPT | Mod: S$GLB,,, | Performed by: INTERNAL MEDICINE

## 2021-08-20 PROCEDURE — 3077F SYST BP >= 140 MM HG: CPT | Mod: CPTII,S$GLB,, | Performed by: INTERNAL MEDICINE

## 2021-08-20 PROCEDURE — 3077F PR MOST RECENT SYSTOLIC BLOOD PRESSURE >= 140 MM HG: ICD-10-PCS | Mod: CPTII,S$GLB,, | Performed by: INTERNAL MEDICINE

## 2021-08-20 PROCEDURE — 1159F PR MEDICATION LIST DOCUMENTED IN MEDICAL RECORD: ICD-10-PCS | Mod: CPTII,S$GLB,, | Performed by: INTERNAL MEDICINE

## 2021-08-20 PROCEDURE — 99204 OFFICE O/P NEW MOD 45 MIN: CPT | Mod: S$GLB,,, | Performed by: INTERNAL MEDICINE

## 2021-08-20 PROCEDURE — 99204 PR OFFICE/OUTPT VISIT, NEW, LEVL IV, 45-59 MIN: ICD-10-PCS | Mod: S$GLB,,, | Performed by: INTERNAL MEDICINE

## 2021-08-20 PROCEDURE — 1160F RVW MEDS BY RX/DR IN RCRD: CPT | Mod: CPTII,S$GLB,, | Performed by: INTERNAL MEDICINE

## 2021-08-20 PROCEDURE — 99999 PR PBB SHADOW E&M-EST. PATIENT-LVL III: ICD-10-PCS | Mod: PBBFAC,,, | Performed by: INTERNAL MEDICINE

## 2021-08-20 PROCEDURE — 93010 RHYTHM STRIP: ICD-10-PCS | Mod: S$GLB,,, | Performed by: INTERNAL MEDICINE

## 2021-08-20 PROCEDURE — 3080F PR MOST RECENT DIASTOLIC BLOOD PRESSURE >= 90 MM HG: ICD-10-PCS | Mod: CPTII,S$GLB,, | Performed by: INTERNAL MEDICINE

## 2021-08-23 DIAGNOSIS — I49.8 OTHER SPECIFIED CARDIAC ARRHYTHMIAS: Primary | ICD-10-CM

## 2021-10-28 ENCOUNTER — OFFICE VISIT (OUTPATIENT)
Dept: CARDIOLOGY | Facility: CLINIC | Age: 46
End: 2021-10-28
Payer: COMMERCIAL

## 2021-10-28 VITALS
HEART RATE: 83 BPM | WEIGHT: 264.13 LBS | OXYGEN SATURATION: 98 % | DIASTOLIC BLOOD PRESSURE: 70 MMHG | BODY MASS INDEX: 40.03 KG/M2 | HEIGHT: 68 IN | SYSTOLIC BLOOD PRESSURE: 141 MMHG

## 2021-10-28 DIAGNOSIS — G47.33 OSA (OBSTRUCTIVE SLEEP APNEA): Chronic | ICD-10-CM

## 2021-10-28 DIAGNOSIS — E66.01 MORBID OBESITY: Chronic | ICD-10-CM

## 2021-10-28 DIAGNOSIS — E78.5 HYPERLIPIDEMIA, UNSPECIFIED HYPERLIPIDEMIA TYPE: Primary | ICD-10-CM

## 2021-10-28 DIAGNOSIS — I49.3 FREQUENT PVCS: ICD-10-CM

## 2021-10-28 PROCEDURE — 3008F PR BODY MASS INDEX (BMI) DOCUMENTED: ICD-10-PCS | Mod: CPTII,S$GLB,, | Performed by: INTERNAL MEDICINE

## 2021-10-28 PROCEDURE — 1159F MED LIST DOCD IN RCRD: CPT | Mod: CPTII,S$GLB,, | Performed by: INTERNAL MEDICINE

## 2021-10-28 PROCEDURE — 99214 OFFICE O/P EST MOD 30 MIN: CPT | Mod: S$GLB,,, | Performed by: INTERNAL MEDICINE

## 2021-10-28 PROCEDURE — 3078F DIAST BP <80 MM HG: CPT | Mod: CPTII,S$GLB,, | Performed by: INTERNAL MEDICINE

## 2021-10-28 PROCEDURE — 3077F SYST BP >= 140 MM HG: CPT | Mod: CPTII,S$GLB,, | Performed by: INTERNAL MEDICINE

## 2021-10-28 PROCEDURE — 3078F PR MOST RECENT DIASTOLIC BLOOD PRESSURE < 80 MM HG: ICD-10-PCS | Mod: CPTII,S$GLB,, | Performed by: INTERNAL MEDICINE

## 2021-10-28 PROCEDURE — 3077F PR MOST RECENT SYSTOLIC BLOOD PRESSURE >= 140 MM HG: ICD-10-PCS | Mod: CPTII,S$GLB,, | Performed by: INTERNAL MEDICINE

## 2021-10-28 PROCEDURE — 1159F PR MEDICATION LIST DOCUMENTED IN MEDICAL RECORD: ICD-10-PCS | Mod: CPTII,S$GLB,, | Performed by: INTERNAL MEDICINE

## 2021-10-28 PROCEDURE — 99214 PR OFFICE/OUTPT VISIT, EST, LEVL IV, 30-39 MIN: ICD-10-PCS | Mod: S$GLB,,, | Performed by: INTERNAL MEDICINE

## 2021-10-28 PROCEDURE — 3008F BODY MASS INDEX DOCD: CPT | Mod: CPTII,S$GLB,, | Performed by: INTERNAL MEDICINE

## 2021-10-28 PROCEDURE — 99999 PR PBB SHADOW E&M-EST. PATIENT-LVL IV: ICD-10-PCS | Mod: PBBFAC,,, | Performed by: INTERNAL MEDICINE

## 2021-10-28 PROCEDURE — 99999 PR PBB SHADOW E&M-EST. PATIENT-LVL IV: CPT | Mod: PBBFAC,,, | Performed by: INTERNAL MEDICINE

## 2021-10-28 RX ORDER — VERAPAMIL HYDROCHLORIDE 180 MG/1
180 CAPSULE, EXTENDED RELEASE ORAL DAILY
Qty: 30 CAPSULE | Refills: 11 | Status: SHIPPED | OUTPATIENT
Start: 2021-10-28 | End: 2023-01-09

## 2021-12-01 ENCOUNTER — OFFICE VISIT (OUTPATIENT)
Dept: SLEEP MEDICINE | Facility: CLINIC | Age: 46
End: 2021-12-01
Payer: COMMERCIAL

## 2021-12-01 DIAGNOSIS — R06.83 SNORING: ICD-10-CM

## 2021-12-01 DIAGNOSIS — R35.1 NOCTURIA: ICD-10-CM

## 2021-12-01 DIAGNOSIS — F51.09 OTHER INSOMNIA NOT DUE TO A SUBSTANCE OR KNOWN PHYSIOLOGICAL CONDITION: Primary | ICD-10-CM

## 2021-12-01 PROCEDURE — 99204 OFFICE O/P NEW MOD 45 MIN: CPT | Mod: 95,,, | Performed by: INTERNAL MEDICINE

## 2021-12-01 PROCEDURE — 99204 PR OFFICE/OUTPT VISIT, NEW, LEVL IV, 45-59 MIN: ICD-10-PCS | Mod: 95,,, | Performed by: INTERNAL MEDICINE

## 2021-12-08 ENCOUNTER — TELEPHONE (OUTPATIENT)
Dept: SLEEP MEDICINE | Facility: OTHER | Age: 46
End: 2021-12-08
Payer: COMMERCIAL

## 2021-12-14 ENCOUNTER — TELEPHONE (OUTPATIENT)
Dept: SLEEP MEDICINE | Facility: OTHER | Age: 46
End: 2021-12-14
Payer: COMMERCIAL

## 2021-12-22 ENCOUNTER — TELEPHONE (OUTPATIENT)
Dept: SLEEP MEDICINE | Facility: OTHER | Age: 46
End: 2021-12-22
Payer: COMMERCIAL

## 2021-12-23 ENCOUNTER — HOSPITAL ENCOUNTER (OUTPATIENT)
Dept: SLEEP MEDICINE | Facility: OTHER | Age: 46
Discharge: HOME | End: 2021-12-23
Attending: INTERNAL MEDICINE
Payer: COMMERCIAL

## 2021-12-23 DIAGNOSIS — R06.83 SNORING: ICD-10-CM

## 2021-12-23 DIAGNOSIS — R35.1 NOCTURIA: ICD-10-CM

## 2021-12-23 DIAGNOSIS — F51.09 OTHER INSOMNIA NOT DUE TO A SUBSTANCE OR KNOWN PHYSIOLOGICAL CONDITION: ICD-10-CM

## 2021-12-23 PROCEDURE — 95806 PR SLEEP STUDY, UNATTENDED, SIMUL RECORD HR/O2 SAT/RESP FLOW/RESP EFFT: ICD-10-PCS | Mod: 26,,, | Performed by: INTERNAL MEDICINE

## 2021-12-23 PROCEDURE — 95800 SLP STDY UNATTENDED: CPT

## 2021-12-23 PROCEDURE — 95806 SLEEP STUDY UNATT&RESP EFFT: CPT | Mod: 26,,, | Performed by: INTERNAL MEDICINE

## 2021-12-30 ENCOUNTER — PATIENT MESSAGE (OUTPATIENT)
Dept: SLEEP MEDICINE | Facility: CLINIC | Age: 46
End: 2021-12-30
Payer: COMMERCIAL

## 2021-12-30 DIAGNOSIS — G47.33 OSA (OBSTRUCTIVE SLEEP APNEA): Primary | ICD-10-CM

## 2022-03-10 ENCOUNTER — OFFICE VISIT (OUTPATIENT)
Dept: FAMILY MEDICINE | Facility: CLINIC | Age: 47
End: 2022-03-10
Payer: COMMERCIAL

## 2022-03-10 VITALS
SYSTOLIC BLOOD PRESSURE: 176 MMHG | BODY MASS INDEX: 41.9 KG/M2 | OXYGEN SATURATION: 98 % | DIASTOLIC BLOOD PRESSURE: 84 MMHG | WEIGHT: 276.44 LBS | HEIGHT: 68 IN | TEMPERATURE: 98 F | HEART RATE: 45 BPM

## 2022-03-10 DIAGNOSIS — Z12.11 COLON CANCER SCREENING: ICD-10-CM

## 2022-03-10 DIAGNOSIS — Z80.42 FAMILY HISTORY OF PROSTATE CANCER: ICD-10-CM

## 2022-03-10 DIAGNOSIS — Z11.4 SCREENING FOR HIV WITHOUT PRESENCE OF RISK FACTORS: ICD-10-CM

## 2022-03-10 DIAGNOSIS — Z00.00 ROUTINE GENERAL MEDICAL EXAMINATION AT A HEALTH CARE FACILITY: Primary | ICD-10-CM

## 2022-03-10 DIAGNOSIS — Z11.59 ENCOUNTER FOR HEPATITIS C SCREENING TEST FOR LOW RISK PATIENT: ICD-10-CM

## 2022-03-10 DIAGNOSIS — I10 HYPERTENSION, UNSPECIFIED TYPE: ICD-10-CM

## 2022-03-10 DIAGNOSIS — Z12.5 SCREENING FOR MALIGNANT NEOPLASM OF PROSTATE: ICD-10-CM

## 2022-03-10 PROCEDURE — 90471 TDAP VACCINE GREATER THAN OR EQUAL TO 7YO IM: ICD-10-PCS | Mod: S$GLB,,, | Performed by: FAMILY MEDICINE

## 2022-03-10 PROCEDURE — 99999 PR PBB SHADOW E&M-EST. PATIENT-LVL IV: ICD-10-PCS | Mod: PBBFAC,,, | Performed by: FAMILY MEDICINE

## 2022-03-10 PROCEDURE — 3079F PR MOST RECENT DIASTOLIC BLOOD PRESSURE 80-89 MM HG: ICD-10-PCS | Mod: CPTII,S$GLB,, | Performed by: FAMILY MEDICINE

## 2022-03-10 PROCEDURE — 4010F PR ACE/ARB THEARPY RXD/TAKEN: ICD-10-PCS | Mod: CPTII,S$GLB,, | Performed by: FAMILY MEDICINE

## 2022-03-10 PROCEDURE — 3077F SYST BP >= 140 MM HG: CPT | Mod: CPTII,S$GLB,, | Performed by: FAMILY MEDICINE

## 2022-03-10 PROCEDURE — 99396 PR PREVENTIVE VISIT,EST,40-64: ICD-10-PCS | Mod: 25,S$GLB,, | Performed by: FAMILY MEDICINE

## 2022-03-10 PROCEDURE — 99396 PREV VISIT EST AGE 40-64: CPT | Mod: 25,S$GLB,, | Performed by: FAMILY MEDICINE

## 2022-03-10 PROCEDURE — 1159F MED LIST DOCD IN RCRD: CPT | Mod: CPTII,S$GLB,, | Performed by: FAMILY MEDICINE

## 2022-03-10 PROCEDURE — 90715 TDAP VACCINE GREATER THAN OR EQUAL TO 7YO IM: ICD-10-PCS | Mod: S$GLB,,, | Performed by: FAMILY MEDICINE

## 2022-03-10 PROCEDURE — 3008F PR BODY MASS INDEX (BMI) DOCUMENTED: ICD-10-PCS | Mod: CPTII,S$GLB,, | Performed by: FAMILY MEDICINE

## 2022-03-10 PROCEDURE — 3008F BODY MASS INDEX DOCD: CPT | Mod: CPTII,S$GLB,, | Performed by: FAMILY MEDICINE

## 2022-03-10 PROCEDURE — 3077F PR MOST RECENT SYSTOLIC BLOOD PRESSURE >= 140 MM HG: ICD-10-PCS | Mod: CPTII,S$GLB,, | Performed by: FAMILY MEDICINE

## 2022-03-10 PROCEDURE — 90471 IMMUNIZATION ADMIN: CPT | Mod: S$GLB,,, | Performed by: FAMILY MEDICINE

## 2022-03-10 PROCEDURE — 90715 TDAP VACCINE 7 YRS/> IM: CPT | Mod: S$GLB,,, | Performed by: FAMILY MEDICINE

## 2022-03-10 PROCEDURE — 3079F DIAST BP 80-89 MM HG: CPT | Mod: CPTII,S$GLB,, | Performed by: FAMILY MEDICINE

## 2022-03-10 PROCEDURE — 1159F PR MEDICATION LIST DOCUMENTED IN MEDICAL RECORD: ICD-10-PCS | Mod: CPTII,S$GLB,, | Performed by: FAMILY MEDICINE

## 2022-03-10 PROCEDURE — 4010F ACE/ARB THERAPY RXD/TAKEN: CPT | Mod: CPTII,S$GLB,, | Performed by: FAMILY MEDICINE

## 2022-03-10 PROCEDURE — 99999 PR PBB SHADOW E&M-EST. PATIENT-LVL IV: CPT | Mod: PBBFAC,,, | Performed by: FAMILY MEDICINE

## 2022-03-10 RX ORDER — VALSARTAN 80 MG/1
80 TABLET ORAL DAILY
Qty: 30 TABLET | Refills: 11 | Status: SHIPPED | OUTPATIENT
Start: 2022-03-10 | End: 2022-04-11 | Stop reason: SDUPTHER

## 2022-03-10 NOTE — PROGRESS NOTES
(Portions of this note were dictated using voice recognition software and may contain dictation related errors in spelling/grammar/syntax not found on text review)    CC:   Chief Complaint   Patient presents with    Establish Care       HPI: 46 y.o. male new patient here to establish care    Hyperlipidemia, saw Cardiology October 2021.  On Lipitor 20 mg daily    Frequent PVCs, had been evaluated by Cardiology, recommended medical therapy with verapamil 180 mg daily.  Had stress test done June of 2021, no evidence of myocardial ischemia.  48 hour Holter showed frequent PVCs, rare PACs.  Tolerating verapamil well, denies any symptoms from PVCs.    Blood pressure markedly elevated . denies any particular symptoms of headache, nausea, vomiting, blurry or double vision, chest pain, shortness of breath, weakness or dizziness..     Gout history, does not get frequent flares.  Tries to control with diet.  Has occasionally gotten colchicine or prednisone through urgent care    Trying to work on his diet.  Exercises regularly.  Works in law enforcement.  Seeing eye doctor after this appointment    Past Medical History:   Diagnosis Date    Gout     Hypertension        Past Surgical History:   Procedure Laterality Date    gout      WISDOM TOOTH EXTRACTION         Family History   Problem Relation Age of Onset    No Known Problems Mother     Prostate cancer Father         60s    No Known Problems Sister     No Known Problems Brother     No Known Problems Brother     Colon cancer Neg Hx     Heart disease Neg Hx     Diabetes Neg Hx        Social History     Tobacco Use    Smoking status: Never Smoker    Smokeless tobacco: Former User     Types: Chew     Quit date: 1/2/2014   Substance Use Topics    Alcohol use: Yes    Drug use: No       No results found for: WBC, HGB, HCT, MCV, PLT, CHOL, TRIG, HDL, LDLDIRECT, ALT, AST, BILITOT, ALKPHOS, NA, K, CL, CREATININE, ESTGFRAFRICA, EGFRNONAA, CALCIUM, ALBUMIN, BUN, CO2,  "TSH, PSA, PSADIAG, INR, LABA1C, HGBA1C, MICROALBUR, MICALBCREAT, LDLCALC, GLU, [id number]          Vital signs reviewed  Vitals:    03/10/22 0900   BP: (!) 190/86   BP Location: Right arm   Patient Position: Sitting   BP Method: Medium (Manual)   Pulse: (!) 45   Temp: 98.1 °F (36.7 °C)   TempSrc: Oral   SpO2: 98%   Weight: 125.4 kg (276 lb 7.3 oz)   Height: 5' 8" (1.727 m)       Wt Readings from Last 4 Encounters:   03/10/22 125.4 kg (276 lb 7.3 oz)   10/28/21 119.8 kg (264 lb 1.8 oz)   08/20/21 120.9 kg (266 lb 8.6 oz)   06/23/21 117.1 kg (258 lb 4.3 oz)       PE:   APPEARANCE: Well nourished, well developed, in no acute distress.    HEAD: Normocephalic, atraumatic.  EYES: PERRL. EOMI.   Conjunctivae noninjected.  EARS: TM's intact. Light reflex normal. No retraction or perforation  NOSE: Mucosa pink. Airway clear.  MOUTH & THROAT: No tonsillar enlargement. No pharyngeal erythema or exudate.   NECK: Supple with no cervical lymphadenopathy.  No carotid bruits.  No thyromegaly  CHEST: Good inspiratory effort. Lungs clear to auscultation with no wheezes or crackles.  CARDIOVASCULAR: Normal S1, S2.  Bradycardic, frequent ectopy noted.  No murmurs..  ABDOMEN: Bowel sounds normal. Not distended. Soft. No tenderness or masses. No organomegaly.  EXTREMITIES: No edema       IMPRESSION  1. Routine general medical examination at a health care facility    2. Hypertension, unspecified type    3. Colon cancer screening    4. Screening for malignant neoplasm of prostate    5. Family history of prostate cancer    6. Encounter for hepatitis C screening test for low risk patient    7. Screening for HIV without presence of risk factors            PLAN  Orders Placed This Encounter   Procedures    (In Office Administered) Tdap Vaccine    CBC Auto Differential    Comprehensive Metabolic Panel    Lipid Panel    TSH    Hemoglobin A1C    Hepatitis C Antibody    HIV 1/2 Ag/Ab (4th Gen)    Uric Acid    PSA, Screening    Fecal " Immunochemical Test (iFOBT)     Screening labs above    Hypertension not controlled.  Continue verapamil 180 mg daily for his PVCs.  Start valsartan 80 mg daily.  Advise frequent blood pressure monitoring with daily readings from home.  Bring blood pressure cuff, blood pressure readings to appointment in 1 month for follow-up.  Can titrate medication accordingly.  Emphasis on healthy diet, sodium reduction, weight loss, regular exercise    Continue cardiology follow-up as directed        SCREENINGS      Immunizations:   Tdap today  COVID:  02/02/2021, 03/02/2021, encourage booster        Age/demographic appropriate health maintenance:  Ordered fit for colon ca sc  PSA given fam hx

## 2022-03-11 ENCOUNTER — LAB VISIT (OUTPATIENT)
Dept: LAB | Facility: HOSPITAL | Age: 47
End: 2022-03-11
Attending: FAMILY MEDICINE
Payer: COMMERCIAL

## 2022-03-11 DIAGNOSIS — Z00.00 ROUTINE GENERAL MEDICAL EXAMINATION AT A HEALTH CARE FACILITY: ICD-10-CM

## 2022-03-11 DIAGNOSIS — Z80.42 FAMILY HISTORY OF PROSTATE CANCER: ICD-10-CM

## 2022-03-11 DIAGNOSIS — Z11.4 SCREENING FOR HIV WITHOUT PRESENCE OF RISK FACTORS: ICD-10-CM

## 2022-03-11 DIAGNOSIS — Z11.59 ENCOUNTER FOR HEPATITIS C SCREENING TEST FOR LOW RISK PATIENT: ICD-10-CM

## 2022-03-11 DIAGNOSIS — Z12.11 COLON CANCER SCREENING: ICD-10-CM

## 2022-03-11 DIAGNOSIS — I10 HYPERTENSION, UNSPECIFIED TYPE: ICD-10-CM

## 2022-03-11 DIAGNOSIS — Z12.5 SCREENING FOR MALIGNANT NEOPLASM OF PROSTATE: ICD-10-CM

## 2022-03-11 LAB
ALBUMIN SERPL BCP-MCNC: 3.9 G/DL (ref 3.5–5.2)
ALP SERPL-CCNC: 51 U/L (ref 55–135)
ALT SERPL W/O P-5'-P-CCNC: 21 U/L (ref 10–44)
ANION GAP SERPL CALC-SCNC: 10 MMOL/L (ref 8–16)
AST SERPL-CCNC: 19 U/L (ref 10–40)
BASOPHILS # BLD AUTO: 0.05 K/UL (ref 0–0.2)
BASOPHILS NFR BLD: 0.5 % (ref 0–1.9)
BILIRUB SERPL-MCNC: 0.6 MG/DL (ref 0.1–1)
BUN SERPL-MCNC: 14 MG/DL (ref 6–20)
CALCIUM SERPL-MCNC: 9 MG/DL (ref 8.7–10.5)
CHLORIDE SERPL-SCNC: 105 MMOL/L (ref 95–110)
CHOLEST SERPL-MCNC: 109 MG/DL (ref 120–199)
CHOLEST/HDLC SERPL: 3.2 {RATIO} (ref 2–5)
CO2 SERPL-SCNC: 22 MMOL/L (ref 23–29)
COMPLEXED PSA SERPL-MCNC: 0.44 NG/ML (ref 0–4)
CREAT SERPL-MCNC: 1 MG/DL (ref 0.5–1.4)
DIFFERENTIAL METHOD: ABNORMAL
EOSINOPHIL # BLD AUTO: 0.2 K/UL (ref 0–0.5)
EOSINOPHIL NFR BLD: 1.9 % (ref 0–8)
ERYTHROCYTE [DISTWIDTH] IN BLOOD BY AUTOMATED COUNT: 13.1 % (ref 11.5–14.5)
EST. GFR  (AFRICAN AMERICAN): >60 ML/MIN/1.73 M^2
EST. GFR  (NON AFRICAN AMERICAN): >60 ML/MIN/1.73 M^2
ESTIMATED AVG GLUCOSE: 120 MG/DL (ref 68–131)
GLUCOSE SERPL-MCNC: 107 MG/DL (ref 70–110)
HBA1C MFR BLD: 5.8 % (ref 4–5.6)
HCT VFR BLD AUTO: 43.5 % (ref 40–54)
HDLC SERPL-MCNC: 34 MG/DL (ref 40–75)
HDLC SERPL: 31.2 % (ref 20–50)
HGB BLD-MCNC: 14.8 G/DL (ref 14–18)
IMM GRANULOCYTES # BLD AUTO: 0.07 K/UL (ref 0–0.04)
IMM GRANULOCYTES NFR BLD AUTO: 0.7 % (ref 0–0.5)
LDLC SERPL CALC-MCNC: 53.2 MG/DL (ref 63–159)
LYMPHOCYTES # BLD AUTO: 4.1 K/UL (ref 1–4.8)
LYMPHOCYTES NFR BLD: 40.3 % (ref 18–48)
MCH RBC QN AUTO: 29.2 PG (ref 27–31)
MCHC RBC AUTO-ENTMCNC: 34 G/DL (ref 32–36)
MCV RBC AUTO: 86 FL (ref 82–98)
MONOCYTES # BLD AUTO: 0.8 K/UL (ref 0.3–1)
MONOCYTES NFR BLD: 7.7 % (ref 4–15)
NEUTROPHILS # BLD AUTO: 5 K/UL (ref 1.8–7.7)
NEUTROPHILS NFR BLD: 48.9 % (ref 38–73)
NONHDLC SERPL-MCNC: 75 MG/DL
NRBC BLD-RTO: 0 /100 WBC
PLATELET # BLD AUTO: 249 K/UL (ref 150–450)
PMV BLD AUTO: 10.5 FL (ref 9.2–12.9)
POTASSIUM SERPL-SCNC: 3.8 MMOL/L (ref 3.5–5.1)
PROT SERPL-MCNC: 7 G/DL (ref 6–8.4)
RBC # BLD AUTO: 5.07 M/UL (ref 4.6–6.2)
SODIUM SERPL-SCNC: 137 MMOL/L (ref 136–145)
TRIGL SERPL-MCNC: 109 MG/DL (ref 30–150)
URATE SERPL-MCNC: 9.7 MG/DL (ref 3.4–7)
WBC # BLD AUTO: 10.23 K/UL (ref 3.9–12.7)

## 2022-03-11 PROCEDURE — 87389 HIV-1 AG W/HIV-1&-2 AB AG IA: CPT | Performed by: FAMILY MEDICINE

## 2022-03-11 PROCEDURE — 84550 ASSAY OF BLOOD/URIC ACID: CPT | Performed by: FAMILY MEDICINE

## 2022-03-11 PROCEDURE — 85025 COMPLETE CBC W/AUTO DIFF WBC: CPT | Performed by: FAMILY MEDICINE

## 2022-03-11 PROCEDURE — 36415 COLL VENOUS BLD VENIPUNCTURE: CPT | Performed by: FAMILY MEDICINE

## 2022-03-11 PROCEDURE — 80061 LIPID PANEL: CPT | Performed by: FAMILY MEDICINE

## 2022-03-11 PROCEDURE — 84443 ASSAY THYROID STIM HORMONE: CPT | Performed by: FAMILY MEDICINE

## 2022-03-11 PROCEDURE — 83036 HEMOGLOBIN GLYCOSYLATED A1C: CPT | Performed by: FAMILY MEDICINE

## 2022-03-11 PROCEDURE — 86803 HEPATITIS C AB TEST: CPT | Performed by: FAMILY MEDICINE

## 2022-03-11 PROCEDURE — 84153 ASSAY OF PSA TOTAL: CPT | Performed by: FAMILY MEDICINE

## 2022-03-11 PROCEDURE — 80053 COMPREHEN METABOLIC PANEL: CPT | Performed by: FAMILY MEDICINE

## 2022-03-12 LAB
HCV AB SERPL QL IA: NEGATIVE
HIV 1+2 AB+HIV1 P24 AG SERPL QL IA: NEGATIVE
TSH SERPL DL<=0.005 MIU/L-ACNC: 1.33 UIU/ML (ref 0.4–4)

## 2022-03-21 ENCOUNTER — PATIENT MESSAGE (OUTPATIENT)
Dept: ADMINISTRATIVE | Facility: HOSPITAL | Age: 47
End: 2022-03-21
Payer: COMMERCIAL

## 2022-03-28 ENCOUNTER — PATIENT OUTREACH (OUTPATIENT)
Dept: ADMINISTRATIVE | Facility: HOSPITAL | Age: 47
End: 2022-03-28
Payer: COMMERCIAL

## 2022-04-01 ENCOUNTER — PATIENT OUTREACH (OUTPATIENT)
Dept: ADMINISTRATIVE | Facility: HOSPITAL | Age: 47
End: 2022-04-01
Payer: COMMERCIAL

## 2022-04-05 ENCOUNTER — LAB VISIT (OUTPATIENT)
Dept: LAB | Facility: HOSPITAL | Age: 47
End: 2022-04-05
Attending: FAMILY MEDICINE
Payer: COMMERCIAL

## 2022-04-05 DIAGNOSIS — Z80.42 FAMILY HISTORY OF PROSTATE CANCER: ICD-10-CM

## 2022-04-05 DIAGNOSIS — Z00.00 ROUTINE GENERAL MEDICAL EXAMINATION AT A HEALTH CARE FACILITY: ICD-10-CM

## 2022-04-05 DIAGNOSIS — Z12.5 SCREENING FOR MALIGNANT NEOPLASM OF PROSTATE: ICD-10-CM

## 2022-04-05 DIAGNOSIS — Z11.4 SCREENING FOR HIV WITHOUT PRESENCE OF RISK FACTORS: ICD-10-CM

## 2022-04-05 DIAGNOSIS — Z11.59 ENCOUNTER FOR HEPATITIS C SCREENING TEST FOR LOW RISK PATIENT: ICD-10-CM

## 2022-04-05 DIAGNOSIS — I10 HYPERTENSION, UNSPECIFIED TYPE: ICD-10-CM

## 2022-04-05 DIAGNOSIS — Z12.11 COLON CANCER SCREENING: ICD-10-CM

## 2022-04-05 PROCEDURE — 82274 ASSAY TEST FOR BLOOD FECAL: CPT | Performed by: FAMILY MEDICINE

## 2022-04-08 ENCOUNTER — PATIENT MESSAGE (OUTPATIENT)
Dept: FAMILY MEDICINE | Facility: CLINIC | Age: 47
End: 2022-04-08
Payer: COMMERCIAL

## 2022-04-08 DIAGNOSIS — R19.5 POSITIVE FIT (FECAL IMMUNOCHEMICAL TEST): Primary | ICD-10-CM

## 2022-04-08 DIAGNOSIS — Z12.11 COLON CANCER SCREENING: ICD-10-CM

## 2022-04-08 LAB — HEMOCCULT STL QL IA: POSITIVE

## 2022-04-08 NOTE — TELEPHONE ENCOUNTER
Dear Mauricio Fernandes    Your recent home stool test (fit) for colon cancer screen was positive meaning that some blood was detected in the stool.  Note that this does not mean that you colon cancer but does mean that we should pursue further screening for colon cancer with a colonoscopy.  I will place an order for colonoscopy in the chart and the staff should contact you in the next few weeks  to go over with you regarding scheduling at your convenience.  If you do not hear anything from them in the next few weeks,  I have attached numbers below that you can call to assist with scheduling    Colonoscopy Phone Numbers  Please call [phone] to schedule your colonoscopy at [facility] location.   If you prefer [facility] on [facility], please call [phone] to schedule.   [facility] location can be reached at [phone] if this location is preferred.    Gabriel De Santiago MD

## 2022-04-11 ENCOUNTER — OFFICE VISIT (OUTPATIENT)
Dept: FAMILY MEDICINE | Facility: CLINIC | Age: 47
End: 2022-04-11
Payer: COMMERCIAL

## 2022-04-11 VITALS
HEART RATE: 37 BPM | HEIGHT: 68 IN | BODY MASS INDEX: 41.63 KG/M2 | DIASTOLIC BLOOD PRESSURE: 78 MMHG | SYSTOLIC BLOOD PRESSURE: 176 MMHG | OXYGEN SATURATION: 98 % | TEMPERATURE: 98 F | WEIGHT: 274.69 LBS

## 2022-04-11 DIAGNOSIS — R00.1 BRADYCARDIA: ICD-10-CM

## 2022-04-11 DIAGNOSIS — G47.33 OSA (OBSTRUCTIVE SLEEP APNEA): ICD-10-CM

## 2022-04-11 DIAGNOSIS — M75.40 ROTATOR CUFF IMPINGEMENT SYNDROME, UNSPECIFIED LATERALITY: ICD-10-CM

## 2022-04-11 DIAGNOSIS — I10 HYPERTENSION, UNSPECIFIED TYPE: Primary | ICD-10-CM

## 2022-04-11 PROBLEM — R73.01 IFG (IMPAIRED FASTING GLUCOSE): Status: ACTIVE | Noted: 2022-04-11

## 2022-04-11 PROCEDURE — 3044F PR MOST RECENT HEMOGLOBIN A1C LEVEL <7.0%: ICD-10-PCS | Mod: CPTII,S$GLB,, | Performed by: FAMILY MEDICINE

## 2022-04-11 PROCEDURE — 4010F PR ACE/ARB THEARPY RXD/TAKEN: ICD-10-PCS | Mod: CPTII,S$GLB,, | Performed by: FAMILY MEDICINE

## 2022-04-11 PROCEDURE — 99999 PR PBB SHADOW E&M-EST. PATIENT-LVL III: ICD-10-PCS | Mod: PBBFAC,,, | Performed by: FAMILY MEDICINE

## 2022-04-11 PROCEDURE — 99999 PR PBB SHADOW E&M-EST. PATIENT-LVL III: CPT | Mod: PBBFAC,,, | Performed by: FAMILY MEDICINE

## 2022-04-11 PROCEDURE — 1160F PR REVIEW ALL MEDS BY PRESCRIBER/CLIN PHARMACIST DOCUMENTED: ICD-10-PCS | Mod: CPTII,S$GLB,, | Performed by: FAMILY MEDICINE

## 2022-04-11 PROCEDURE — 3008F BODY MASS INDEX DOCD: CPT | Mod: CPTII,S$GLB,, | Performed by: FAMILY MEDICINE

## 2022-04-11 PROCEDURE — 3078F PR MOST RECENT DIASTOLIC BLOOD PRESSURE < 80 MM HG: ICD-10-PCS | Mod: CPTII,S$GLB,, | Performed by: FAMILY MEDICINE

## 2022-04-11 PROCEDURE — 1160F RVW MEDS BY RX/DR IN RCRD: CPT | Mod: CPTII,S$GLB,, | Performed by: FAMILY MEDICINE

## 2022-04-11 PROCEDURE — 4010F ACE/ARB THERAPY RXD/TAKEN: CPT | Mod: CPTII,S$GLB,, | Performed by: FAMILY MEDICINE

## 2022-04-11 PROCEDURE — 99214 PR OFFICE/OUTPT VISIT, EST, LEVL IV, 30-39 MIN: ICD-10-PCS | Mod: S$GLB,,, | Performed by: FAMILY MEDICINE

## 2022-04-11 PROCEDURE — 1159F MED LIST DOCD IN RCRD: CPT | Mod: CPTII,S$GLB,, | Performed by: FAMILY MEDICINE

## 2022-04-11 PROCEDURE — 99214 OFFICE O/P EST MOD 30 MIN: CPT | Mod: S$GLB,,, | Performed by: FAMILY MEDICINE

## 2022-04-11 PROCEDURE — 3077F SYST BP >= 140 MM HG: CPT | Mod: CPTII,S$GLB,, | Performed by: FAMILY MEDICINE

## 2022-04-11 PROCEDURE — 3008F PR BODY MASS INDEX (BMI) DOCUMENTED: ICD-10-PCS | Mod: CPTII,S$GLB,, | Performed by: FAMILY MEDICINE

## 2022-04-11 PROCEDURE — 3078F DIAST BP <80 MM HG: CPT | Mod: CPTII,S$GLB,, | Performed by: FAMILY MEDICINE

## 2022-04-11 PROCEDURE — 1159F PR MEDICATION LIST DOCUMENTED IN MEDICAL RECORD: ICD-10-PCS | Mod: CPTII,S$GLB,, | Performed by: FAMILY MEDICINE

## 2022-04-11 PROCEDURE — 3077F PR MOST RECENT SYSTOLIC BLOOD PRESSURE >= 140 MM HG: ICD-10-PCS | Mod: CPTII,S$GLB,, | Performed by: FAMILY MEDICINE

## 2022-04-11 PROCEDURE — 3044F HG A1C LEVEL LT 7.0%: CPT | Mod: CPTII,S$GLB,, | Performed by: FAMILY MEDICINE

## 2022-04-11 RX ORDER — VALSARTAN 160 MG/1
160 TABLET ORAL DAILY
Qty: 30 TABLET | Refills: 11 | Status: SHIPPED | OUTPATIENT
Start: 2022-04-11 | End: 2023-04-27 | Stop reason: SDUPTHER

## 2022-04-11 NOTE — PROGRESS NOTES
(Portions of this note were dictated using voice recognition software and may contain dictation related errors in spelling/grammar/syntax not found on text review)    CC:   Chief Complaint   Patient presents with    Hypertension       HPI: 46 y.o. male seen in March as a new patient to establish care.    Hyperlipidemia, saw Cardiology October 2021.  On Lipitor 20 mg daily    Frequent PVCs, had been evaluated by Cardiology, recommended medical therapy with verapamil 180 mg daily.  Had stress test done June of 2021, no evidence of myocardial ischemia.  48 hour Holter showed frequent PVCs, rare PACs.  Tolerating verapamil well, denies any symptoms from PVCs.    Blood pressure markedly elevated at last visit, denied any symptoms.  Started valsartan 80 mg in addition to his verapamil 180 mg daily.  BP still significantly elevated 176/70, at home was getting 160-165 systolic readings.  Asymptomatic.  Heart rate was 37 today but he denies any fatigue, weakness, dizziness, shortness of breath    Labs show IFG A1c 5.8 glucose 107 fasting    Gout history, does not get frequent flares.  Tries to control with diet.  Has occasionally gotten colchicine or prednisone through urgent care.  Uric acid was elevated at 9.7.    Trying to work on his diet.  Exercises regularly.  Works in law enforcement.  Sees eye doctor    Fit kit given last visit for colon cancer screening.  This was positive.  Was sent a message regarding colonoscopy referral    Sleep apnea:  Diagnosed on home study in December.  Had home sleep study showing severe sleep disordered breathing with AHI of 40 to and RDI respiratory disturbance index very severe at 61. Appoint with Sleep Medicine coming up April 27 (has been on PAP for 3 weeks now)    Last few months his right shoulder has been hurting, no specific injury but feels like it is likely from working out and doing other things.  Overhead motions can worsen it.  No radiating symptoms.  No paresthesias.  No  "meds taken for this.    Past Medical History:   Diagnosis Date    Gout     Hypertension     IFG (impaired fasting glucose)     Obstructive sleep apnea        Past Surgical History:   Procedure Laterality Date    gout      WISDOM TOOTH EXTRACTION         Family History   Problem Relation Age of Onset    No Known Problems Mother     Prostate cancer Father         60s    No Known Problems Sister     No Known Problems Brother     No Known Problems Brother     Colon cancer Neg Hx     Heart disease Neg Hx     Diabetes Neg Hx        Social History     Tobacco Use    Smoking status: Never Smoker    Smokeless tobacco: Former User     Types: Chew     Quit date: 1/2/2014   Substance Use Topics    Alcohol use: Yes     Comment: 1-2 per week.    Drug use: No       Lab Results   Component Value Date    WBC 10.23 03/11/2022    HGB 14.8 03/11/2022    HCT 43.5 03/11/2022    MCV 86 03/11/2022     03/11/2022    CHOL 109 (L) 03/11/2022    TRIG 109 03/11/2022    HDL 34 (L) 03/11/2022    ALT 21 03/11/2022    AST 19 03/11/2022    BILITOT 0.6 03/11/2022    ALKPHOS 51 (L) 03/11/2022     03/11/2022    K 3.8 03/11/2022     03/11/2022    CREATININE 1.0 03/11/2022    ESTGFRAFRICA >60 03/11/2022    EGFRNONAA >60 03/11/2022    CALCIUM 9.0 03/11/2022    ALBUMIN 3.9 03/11/2022    BUN 14 03/11/2022    CO2 22 (L) 03/11/2022    TSH 1.327 03/11/2022    PSA 0.44 03/11/2022    HGBA1C 5.8 (H) 03/11/2022    LDLCALC 53.2 (L) 03/11/2022     03/11/2022             Vital signs reviewed  Vitals:    04/11/22 0920   BP: (!) 176/78   BP Location: Left arm   Patient Position: Sitting   BP Method: Medium (Manual)   Pulse: (!) 37   Temp: 98.1 °F (36.7 °C)   TempSrc: Oral   SpO2: 98%   Weight: 124.6 kg (274 lb 11.1 oz)   Height: 5' 8" (1.727 m)       Wt Readings from Last 4 Encounters:   04/11/22 124.6 kg (274 lb 11.1 oz)   03/10/22 125.4 kg (276 lb 7.3 oz)   10/28/21 119.8 kg (264 lb 1.8 oz)   08/20/21 120.9 kg (266 lb 8.6 " oz)       PE:   APPEARANCE: Well nourished, well developed, in no acute distress.    HEAD: Normocephalic, atraumatic.  EYES:    Conjunctivae noninjected.   NECK: Supple with no cervical lymphadenopathy.  No carotid bruits.  No thyromegaly  CHEST: Good inspiratory effort. Lungs clear to auscultation with no wheezes or crackles.  CARDIOVASCULAR: Normal S1, S2.  Bradycardic.  ABDOMEN: Bowel sounds normal. Not distended. Soft. No tenderness or masses. No organomegaly.  EXTREMITIES: No edema   MSK:  Right shoulder:  Positive painful arc on the right side.  Positive empty can test for pain.  Positive external rotation test for pain and weakness.  Positive Neer's and Barnett impingement test.    IMPRESSION  1. Hypertension, unspecified type    2. Bradycardia    3. CHETAN (obstructive sleep apnea)    4. Rotator cuff impingement syndrome, unspecified laterality            PLAN    Screening labs above    Increase Diovan to 160 mg daily.  For now continue verapamil 180 per Cardiology although noticing asymptomatic bradycardia may need to go down this.    He is now on PAP treatment for his sleep apnea for the last 3 weeks.  Has appointment coming up with sleep medicine later this month    Continue cardiology follow-up as directed    One-month blood pressure follow-up        Likely rotator cuff impingement syndrome regarding the right shoulder.  Advise on rest of overhead activities for week.  Rotator cuff rehab exercises provided.  Can take Tylenol for pain.  Avoid NSAIDs right now given his blood pressure concern. .  Can further workup for any persistence or progression of symptoms            SCREENINGS      Immunizations:   Tdap 2022  COVID:  02/02/2021, 03/02/2021, encourage booster        Age/demographic appropriate health maintenance:  Fit positive April 2022, colonoscopy ordered  PSA normal (family history:  Prostate cancer)

## 2022-04-26 ENCOUNTER — PATIENT OUTREACH (OUTPATIENT)
Dept: ADMINISTRATIVE | Facility: OTHER | Age: 47
End: 2022-04-26
Payer: COMMERCIAL

## 2022-05-17 ENCOUNTER — TELEPHONE (OUTPATIENT)
Dept: FAMILY MEDICINE | Facility: CLINIC | Age: 47
End: 2022-05-17
Payer: COMMERCIAL

## 2022-05-17 NOTE — TELEPHONE ENCOUNTER
----- Message from Jacquie Milner sent at 5/17/2022  9:13 AM CDT -----  Type:  Needs Medical Advice    Who Called: self  Reason:needs to reschedule appointment   Would the patient rather a call back or a response via [facility]call  Best Call Back Number: [phone].  Additional Information: I didn't see anything until 7/1

## 2022-05-17 NOTE — TELEPHONE ENCOUNTER
Spoke to pt informed that he is scheduled with Dr. De Santiago Thursday 5/19. Pt verbalized an understanding.

## 2022-05-31 ENCOUNTER — TELEPHONE (OUTPATIENT)
Dept: SLEEP MEDICINE | Facility: CLINIC | Age: 47
End: 2022-05-31
Payer: COMMERCIAL

## 2022-05-31 NOTE — TELEPHONE ENCOUNTER
Staff reached out to the pt to confirm his appt for tomorrow but he did not answer, so I left a message on the pt's voicemail.

## 2022-06-01 ENCOUNTER — TELEPHONE (OUTPATIENT)
Dept: ENDOSCOPY | Facility: HOSPITAL | Age: 47
End: 2022-06-01
Payer: COMMERCIAL

## 2022-06-01 NOTE — TELEPHONE ENCOUNTER
Attempted to contact Mauricio Fernandes  to schedule procedure , no answer, left message with call back number

## 2022-06-07 ENCOUNTER — TELEPHONE (OUTPATIENT)
Dept: SLEEP MEDICINE | Facility: CLINIC | Age: 47
End: 2022-06-07
Payer: COMMERCIAL

## 2022-06-07 NOTE — PROGRESS NOTES
The patient location is: [place]  The chief complaint leading to consultation is: CHTEAN    Visit type: audiovisual    20 minutes of total time spent on the encounter, which includes face to face time and non-face to face time preparing to see the patient (eg, review of tests), Obtaining and/or reviewing separately obtained history, Documenting clinical information in the electronic or other health record, Independently interpreting results (not separately reported) and communicating results to the patient/family/caregiver, or Care coordination (not separately reported).     Each patient to whom he or she provides medical services by telemedicine is:  (1) informed of the relationship between the physician and patient and the respective role of any other health care provider with respect to management of the patient; and (2) notified that he or she may decline to receive medical services by telemedicine and may withdraw from such care at any time.      ESTABLISHED PATIENT VISIT    Mauricio Fernandes  is a pleasant 46 y.o. male  with PMH significant for frequent PVCs, BMI elevated,  who presents today for evaluation of snoring, feeling tired, possible CHETAN.    Here today for CPAP follow-up    PLAN last visit:   -will proceed with sleep testing   -discussed trial of PAP therapy if CHETAN present   -driving precautions were discussed with the patient      Since last visit:     HST 12.23.21: AHI 42, RDI 61  [facility], ordered auto  He received CPAP, he has been sleeping better  Wearing nightly    PAP history   Problems    Mask Nasal    Pressure comfortable   Benefit Sleeps better   DME HME   Machine age March 2022   Download pending       SLEEP SCHEDULE     Nights Off   Bed Time 8-9A 10-11pm   Sleep Latency Not long Not long   Arousals Less often none   Nocturia once    Back to sleep     Wake time 3:30PM 7-8AM   Naps No longer needing nap before work    Work Nights 10:30p -7 AM 5 days (flips back to normal)      There were no  "vitals filed for this visit.  Physical Exam:    GEN:   Well-appearing  Psych:  Appropriate affect, demonstrates insight  SKIN:  No rash on the face or bridge of the nose    LABS:   Lab Results   Component Value Date    HGB 14.8 03/11/2022    CO2 22 (L) 03/11/2022       RECORDS REVIEWED:    No prior sleep testing.    ASSESSMENT    No flowsheet data found.  PROBLEM DESCRIPTION  STATUS   CHETAN   Loud snoring, unrefreshing sleep, tired during the day   Wearing nightly  Symptoms improved Likely controlled   Daytime Sx   + sleepiness when inactive   denies sleepiness when driving   ESS n/a/24 on intake Less sleepiness improved   Allergies   2-3 "sinus infections a year"  Antihistamine: zyrtec prn  Nasal sprays:   Surgeries:      Occasional congestion intermittent   Shift work   Works nights  5 days per week, flips back Tolerating well n/a   Nocturia   3-4 times per night Once per night  improved   Other issues:     PLAN     Continue 5-12 for now  -trial of flonase daily   -adjust pressures based on download  -the patient is using and benefiting from PAP therapy      RTC        The patient was given open opportunity to ask questions and/or express concerns about treatment plan.   All questions/concerns were discussed.     Two patient identifiers used prior to evaluation.         "

## 2022-06-08 ENCOUNTER — OFFICE VISIT (OUTPATIENT)
Dept: SLEEP MEDICINE | Facility: CLINIC | Age: 47
End: 2022-06-08
Payer: COMMERCIAL

## 2022-06-08 DIAGNOSIS — R35.1 NOCTURIA: ICD-10-CM

## 2022-06-08 DIAGNOSIS — R53.83 FATIGUE, UNSPECIFIED TYPE: ICD-10-CM

## 2022-06-08 DIAGNOSIS — J30.9 ALLERGIC SINUSITIS: ICD-10-CM

## 2022-06-08 DIAGNOSIS — G47.33 SEVERE OBSTRUCTIVE SLEEP APNEA: Primary | ICD-10-CM

## 2022-06-08 PROCEDURE — 99214 OFFICE O/P EST MOD 30 MIN: CPT | Mod: 95,,, | Performed by: INTERNAL MEDICINE

## 2022-06-08 PROCEDURE — 3044F HG A1C LEVEL LT 7.0%: CPT | Mod: CPTII,95,, | Performed by: INTERNAL MEDICINE

## 2022-06-08 PROCEDURE — 4010F PR ACE/ARB THEARPY RXD/TAKEN: ICD-10-PCS | Mod: CPTII,95,, | Performed by: INTERNAL MEDICINE

## 2022-06-08 PROCEDURE — 4010F ACE/ARB THERAPY RXD/TAKEN: CPT | Mod: CPTII,95,, | Performed by: INTERNAL MEDICINE

## 2022-06-08 PROCEDURE — 99214 PR OFFICE/OUTPT VISIT, EST, LEVL IV, 30-39 MIN: ICD-10-PCS | Mod: 95,,, | Performed by: INTERNAL MEDICINE

## 2022-06-08 PROCEDURE — 3044F PR MOST RECENT HEMOGLOBIN A1C LEVEL <7.0%: ICD-10-PCS | Mod: CPTII,95,, | Performed by: INTERNAL MEDICINE

## 2022-06-15 NOTE — PATIENT INSTRUCTIONS
Understanding Dizziness, Balance Problems, and Fainting     The eyes, inner ear, joints, and muscles send signals to the brain to achieve balance.     Balance is a group effort of the eyes, inner ear, joints, and muscles. They each send signals to the brain about body position and head movement. Then the brain uses this information to achieve balance. When the brain receives conflicting signals, or when there is a problem with blood flow, dizziness or fainting can happen.  Vertigo  Vertigo is the feeling of spinning. It may happen if the brain receives conflicting balance signals. Vertigo is often caused by a problem in the inner ear. Problems include changes in inner ear structures, infection, swelling, or excess fluid. Sometimes vertigo is due to a brain problem, such as migraine.   Dysequilibrium  Dysequilibrium is the feeling of imbalance without a sense of spinning. It may happen if the signal path between the body and brain is disrupted. There are many causes of dysequilibrium, including diabetes, anemia, head injury, and aging.  Syncope  Syncope is losing consciousness or fainting. The brain needs oxygen-rich blood to function. The heart pumps that blood to the brain. If there is a problem with the heart, blood flow (such as low blood pressure), or blood vessels, faintness may happen.  Follow up with pcp  Date Last Reviewed: 10/6/2015  © 4036-8212 [employer], [employer]. [address] [place] [address], [place] [address]. All rights reserved. This information is not intended as a substitute for professional medical care. Always follow your healthcare professional's instructions.         -pt has capacity to leave AMA. pt was explained by medical attending need and purpose for echocardiogram and possible stress test to rule out blockages in heart, and risks of possible MI and death if he were to leave AMA. pt was able to articulate this back to undersigner, continuing to demand to leave AMA. he was able to verbalize possible risk of heart attack and death and he were leave AMA. pt has capacity to leave AMA.   -pt has capacity to leave AMA. pt was explained by medical attending need and purpose for echocardiogram and possible stress test to rule out blockages in heart, and risks of possible MI and death if he were to leave AMA. pt was able to articulate this back to undersigner, continuing to demand to leave AMA. he was able to verbalize possible risk of heart attack and death if he were leave AMA. pt has capacity to leave AMA.

## 2022-06-16 ENCOUNTER — PATIENT OUTREACH (OUTPATIENT)
Dept: ADMINISTRATIVE | Facility: HOSPITAL | Age: 47
End: 2022-06-16
Payer: COMMERCIAL

## 2022-06-22 ENCOUNTER — TELEPHONE (OUTPATIENT)
Dept: ENDOSCOPY | Facility: HOSPITAL | Age: 47
End: 2022-06-22
Payer: COMMERCIAL

## 2022-06-22 NOTE — TELEPHONE ENCOUNTER
Second attempt to contact Mauricio Fernandes to schedule procedure , no answer, left message with call back number

## 2022-07-20 ENCOUNTER — TELEPHONE (OUTPATIENT)
Dept: ENDOSCOPY | Facility: HOSPITAL | Age: 47
End: 2022-07-20
Payer: COMMERCIAL

## 2023-02-17 ENCOUNTER — PATIENT MESSAGE (OUTPATIENT)
Dept: FAMILY MEDICINE | Facility: CLINIC | Age: 48
End: 2023-02-17
Payer: COMMERCIAL

## 2023-03-13 ENCOUNTER — TELEPHONE (OUTPATIENT)
Dept: GASTROENTEROLOGY | Facility: CLINIC | Age: 48
End: 2023-03-13
Payer: COMMERCIAL

## 2023-03-13 NOTE — TELEPHONE ENCOUNTER
RE: GI Colonoscopy Referral Call     Patient call back basket. Attempted to call patient to scheduled colonoscopy procedure. Call outcome: Left voice message with clinic number for return call.      Call time: 1 minutes

## 2023-03-15 DIAGNOSIS — I10 HYPERTENSION, UNSPECIFIED TYPE: ICD-10-CM

## 2023-03-17 ENCOUNTER — TELEPHONE (OUTPATIENT)
Dept: GASTROENTEROLOGY | Facility: CLINIC | Age: 48
End: 2023-03-17
Payer: COMMERCIAL

## 2023-03-17 NOTE — LETTER
[place] [facility]  [address], [address]  [place] [address]  Phone: [phone]       SUPREP Instructions    [facility]  [address] [facility]  Clinic Office [phone]  Endoscopy Lab [phone]    You are scheduled for a Colonoscopy with Dr. CUELLAR  on 4/26/23 at [facility] in [place].    Check in at the Hospital -1st floor, Information desk.   Call [phone] to reschedule.     An adult friend/family member must come with you to drive you home.  You cannot drive, take a taxi, Uber/Lyft or bus to leave the Endoscopy Center alone.  If you do not have someone to drive you home, your test will be cancelled.      Please follow the directions of your doctor if you take any pills that thin your blood. If you take these meds: Aggrenox, Brilinta, Effient, Eliquis, Lovenox, Plavix, Pletal, Pradaxa, Ticilid, Xarelto or Coumadin, let the doctor's office know.     DON'T: On the morning of the test do not take insulin or pills for diabetes.      DO: On the morning of the test, do take any pills for blood pressure, heart, anti-rejection and or seizures with a small sip of water. Bring any inhalers with you.     To have a good prep, you must follow these instructions - please do not use the directions from the pharmacy.     The doctor will send a prescription for the SUPREP.      The Day Before the test:     You can only drink CLEAR LIQUIDS the whole day before your test.  You can't eat any food for the whole day.     You CAN have:  o Water, Coffee or decaf coffee (no milk or cream)  o Tea  o Soft drinks - regular and sugar free  o Jello (green or yellow)  o Apple Juice, white grape juice, white cranberry juice  o Gatorade, Power Aid, Crystal Light, Roberto Aid  o Lemonade and Limeade  o Bouillon, clear soup  o Snowball, popsicles  o YOU CAN'T DRINK ANYTHING RED, PURPLE ORANGE OR BLUE   o YOU CAN'T DRINK ALCOHOL  o ONLY DRINK WHAT IS ON THE LIST  o      At 5 pm the night  before your test:    o Pour the 1st bottle of SUPREP into the cup provided in the box. Add water to the line on the cup and mix well.  Drink the whole cup and then drink 2 more full cups of water over 1 hour.  - This can be easier to drink if it is cold. You can mix it 20 minutes ahead of time and place in the refrigerator before you drink it.  You must drink it within 30-45 minutes of mixing it.  Do NOT pour the drink over ice.  You can drink it with a straw.    The Day of the test - We will call you 2 days before your test to tell you what time to get there.     5 hours before you come to the hospital (this may be in the middle of the night)  o Pour the 2nd bottle of SUPREP into the cup provided in the box. Add water to the line on the cup and mix well.  Drink the whole cup and then drink 2 more full cups of water over 1 hour.  - It might be easier to drink if it is cold. You can mix it 20 minutes ahead of time and place in the refrigerator before you drink it.  You must drink it within 30-45 minutes of mixing it.  Do NOT pour the drink over ice.  You can drink it with a straw.    o YOU CAN'T EAT OR DRINK ANYTHING ELSE ONCE YOU FINISH THE PREP    o Leave all valuables and jewelry at home. You will be at the hospital for 2-4 hours.    o Call the Endoscopy department at [phone] with any questions about your procedure.

## 2023-03-17 NOTE — TELEPHONE ENCOUNTER
Endoscopy Scheduling Questionnaire:         Are you taking any blood thinners? no               If Yes  Have you been on them for longer than one year? no    2. Have you been diagnosed with Diverticulitis in past three months?  no    3. Are you on dialysis or have Kidney Disease? no    4. Previous Colonoscopy?  no         If yes Do you have a history of colon polyps?  no      6. Are you a diabetic?  no    7. Do you have a history of constipation?  no      Procedure scheduled with Dr. Harman  on  4/26/27    The prep being used is Suprep     The patient's prep instructions were sent by Mail and Portal             SUPREP Instructions    [facility] [address] Clinic Office [phone]  Endoscopy Lab [phone]    You are scheduled for a Colonoscopy with Dr. Harman  on 4/26/27 at [facility] in [place].    Check in at the Hospital -1st floor, Information desk.   Call [phone] to reschedule.    An adult friend/family member must come with you to drive you home.  You cannot drive, take a taxi, Uber/Lyft or bus to leave the Endoscopy Center alone.  If you do not have someone to drive you home, your test will be cancelled.     Please follow the directions of your doctor if you take any pills that thin your blood. If you take these meds: Aggrenox, Brilinta, Effient, Eliquis, Lovenox, Plavix, Pletal, Pradaxa, Ticilid, Xarelto or Coumadin, let the doctor's office know.    DON'T: On the morning of the test do not take insulin or pills for diabetes.     DO: On the morning of the test, do take any pills for blood pressure, heart, anti-rejection and or seizures with a small sip of water. Bring any inhalers with you.    To have a good prep, you must follow these instructions - please do not use the directions from the pharmacy.    The doctor will send a prescription for the SUPREP.      The Day Before the test:    You can only drink CLEAR LIQUIDS the whole day before your test.  You  can't eat any food for the whole day.    You CAN have:  Water, Coffee or decaf coffee (no milk or cream)  Tea  Soft drinks - regular and sugar free  Jello (green or yellow)  Apple Juice, white grape juice, white cranberry juice  Gatorade, Power Aid, Crystal Light, Roberto Aid  Lemonade and Limeade  Bouillon, clear soup  Snowball, popsicles  YOU CAN'T DRINK ANYTHING RED, PURPLE ORANGE OR BLUE   YOU CAN'T DRINK ALCOHOL  ONLY DRINK WHAT IS ON THE LIST      At 5 pm the night before your test:    Pour the 1st bottle of SUPREP into the cup provided in the box. Add water to the line on the cup and mix well.  Drink the whole cup and then drink 2 more full cups of water over 1 hour.  This can be easier to drink if it is cold. You can mix it 20 minutes ahead of time and place in the refrigerator before you drink it.  You must drink it within 30-45 minutes of mixing it.  Do NOT pour the drink over ice.  You can drink it with a straw.    The Day of the test - We will call you 2 days before your test to tell you what time to get there.    5 hours before you come to the hospital (this may be in the middle of the night)  Pour the 2nd bottle of SUPREP into the cup provided in the box. Add water to the line on the cup and mix well.  Drink the whole cup and then drink 2 more full cups of water over 1 hour.  It might be easier to drink if it is cold. You can mix it 20 minutes ahead of time and place in the refrigerator before you drink it.  You must drink it within 30-45 minutes of mixing it.  Do NOT pour the drink over ice.  You can drink it with a straw.    YOU CAN'T EAT OR DRINK ANYTHING ELSE ONCE YOU FINISH THE PREP    Leave all valuables and jewelry at home. You will be at the hospital for 2-4 hours.    Call the Endoscopy department at [phone] with any questions about your procedure.

## 2023-03-20 RX ORDER — SODIUM, POTASSIUM,MAG SULFATES 17.5-3.13G
1 SOLUTION, RECONSTITUTED, ORAL ORAL DAILY
Qty: 1 KIT | Refills: 0 | Status: SHIPPED | OUTPATIENT
Start: 2023-03-20 | End: 2023-03-22

## 2023-04-24 ENCOUNTER — TELEPHONE (OUTPATIENT)
Dept: ENDOSCOPY | Facility: HOSPITAL | Age: 48
End: 2023-04-24
Payer: COMMERCIAL

## 2023-04-24 NOTE — TELEPHONE ENCOUNTER
Spoke with patient about arrival time @ 0730.   Colon    Prep instructions reviewed: the day before the procedure, follow a clear liquid diet all day, then start the first 1/2 of prep at 5pm and take 2nd 1/2 of prep @ 0230.  Pt must be completely NPO when prep completed @ 0430.              Medications: Do not take Insulin or oral diabetic medications the day of the procedure.  Take as prescribed: heart, seizure and blood pressure medication in the morning with a sip of water (less than an ounce).  Take any breathing medications and bring inhalers to hospital with you Leave all valuables and jewelry at home.     Wear comfortable clothes to procedure to change into hospital gown You cannot drive for 24 hours after your procedure because you will receive sedation for your procedure to make you comfortable.  A ride must be provided at discharge.

## 2023-04-26 ENCOUNTER — ANESTHESIA EVENT (OUTPATIENT)
Dept: ENDOSCOPY | Facility: HOSPITAL | Age: 48
End: 2023-04-26
Payer: COMMERCIAL

## 2023-04-26 ENCOUNTER — HOSPITAL ENCOUNTER (OUTPATIENT)
Facility: HOSPITAL | Age: 48
Discharge: HOME | End: 2023-04-26
Attending: INTERNAL MEDICINE | Admitting: INTERNAL MEDICINE
Payer: COMMERCIAL

## 2023-04-26 ENCOUNTER — ANESTHESIA (OUTPATIENT)
Dept: ENDOSCOPY | Facility: HOSPITAL | Age: 48
End: 2023-04-26
Payer: COMMERCIAL

## 2023-04-26 VITALS
OXYGEN SATURATION: 96 % | WEIGHT: 265 LBS | SYSTOLIC BLOOD PRESSURE: 131 MMHG | HEIGHT: 68 IN | TEMPERATURE: 99 F | HEART RATE: 84 BPM | DIASTOLIC BLOOD PRESSURE: 54 MMHG | RESPIRATION RATE: 17 BRPM | BODY MASS INDEX: 40.16 KG/M2

## 2023-04-26 DIAGNOSIS — Z12.11 COLON CANCER SCREENING: ICD-10-CM

## 2023-04-26 PROCEDURE — 88305 TISSUE EXAM BY PATHOLOGIST: CPT | Performed by: STUDENT IN AN ORGANIZED HEALTH CARE EDUCATION/TRAINING PROGRAM

## 2023-04-26 PROCEDURE — 45385 COLONOSCOPY W/LESION REMOVAL: CPT | Mod: 33,,, | Performed by: INTERNAL MEDICINE

## 2023-04-26 PROCEDURE — 25000003 PHARM REV CODE 250: Performed by: NURSE ANESTHETIST, CERTIFIED REGISTERED

## 2023-04-26 PROCEDURE — 27201089 HC SNARE, DISP (ANY): Performed by: INTERNAL MEDICINE

## 2023-04-26 PROCEDURE — 37000008 HC ANESTHESIA 1ST 15 MINUTES: Performed by: INTERNAL MEDICINE

## 2023-04-26 PROCEDURE — D9220A PRA ANESTHESIA: Mod: 33,CRNA,, | Performed by: NURSE ANESTHETIST, CERTIFIED REGISTERED

## 2023-04-26 PROCEDURE — D9220A PRA ANESTHESIA: ICD-10-PCS | Mod: 33,CRNA,, | Performed by: NURSE ANESTHETIST, CERTIFIED REGISTERED

## 2023-04-26 PROCEDURE — 25000003 PHARM REV CODE 250: Performed by: INTERNAL MEDICINE

## 2023-04-26 PROCEDURE — 63600175 PHARM REV CODE 636 W HCPCS: Performed by: NURSE ANESTHETIST, CERTIFIED REGISTERED

## 2023-04-26 PROCEDURE — 37000009 HC ANESTHESIA EA ADD 15 MINS: Performed by: INTERNAL MEDICINE

## 2023-04-26 PROCEDURE — 45385 PR COLONOSCOPY,REMV LESN,SNARE: ICD-10-PCS | Mod: 33,,, | Performed by: INTERNAL MEDICINE

## 2023-04-26 PROCEDURE — 45385 COLONOSCOPY W/LESION REMOVAL: CPT | Mod: PT | Performed by: INTERNAL MEDICINE

## 2023-04-26 PROCEDURE — D9220A PRA ANESTHESIA: ICD-10-PCS | Mod: 33,ANES,, | Performed by: ANESTHESIOLOGY

## 2023-04-26 PROCEDURE — D9220A PRA ANESTHESIA: Mod: 33,ANES,, | Performed by: ANESTHESIOLOGY

## 2023-04-26 PROCEDURE — 88305 TISSUE EXAM BY PATHOLOGIST: ICD-10-PCS | Mod: 26,,, | Performed by: STUDENT IN AN ORGANIZED HEALTH CARE EDUCATION/TRAINING PROGRAM

## 2023-04-26 PROCEDURE — 88305 TISSUE EXAM BY PATHOLOGIST: CPT | Mod: 26,,, | Performed by: STUDENT IN AN ORGANIZED HEALTH CARE EDUCATION/TRAINING PROGRAM

## 2023-04-26 RX ORDER — PROPOFOL 10 MG/ML
VIAL (ML) INTRAVENOUS
Status: DISCONTINUED | OUTPATIENT
Start: 2023-04-26 | End: 2023-04-26

## 2023-04-26 RX ORDER — SODIUM CHLORIDE 9 MG/ML
INJECTION, SOLUTION INTRAVENOUS CONTINUOUS
Status: DISCONTINUED | OUTPATIENT
Start: 2023-04-26 | End: 2023-04-26 | Stop reason: HOSPADM

## 2023-04-26 RX ORDER — PROPOFOL 10 MG/ML
VIAL (ML) INTRAVENOUS CONTINUOUS PRN
Status: DISCONTINUED | OUTPATIENT
Start: 2023-04-26 | End: 2023-04-26

## 2023-04-26 RX ORDER — ONDANSETRON 2 MG/ML
INJECTION INTRAMUSCULAR; INTRAVENOUS
Status: DISCONTINUED | OUTPATIENT
Start: 2023-04-26 | End: 2023-04-26

## 2023-04-26 RX ORDER — LIDOCAINE HYDROCHLORIDE 20 MG/ML
INJECTION INTRAVENOUS
Status: DISCONTINUED | OUTPATIENT
Start: 2023-04-26 | End: 2023-04-26

## 2023-04-26 RX ORDER — SODIUM CHLORIDE, SODIUM LACTATE, POTASSIUM CHLORIDE, CALCIUM CHLORIDE 600; 310; 30; 20 MG/100ML; MG/100ML; MG/100ML; MG/100ML
INJECTION, SOLUTION INTRAVENOUS CONTINUOUS PRN
Status: DISCONTINUED | OUTPATIENT
Start: 2023-04-26 | End: 2023-04-26

## 2023-04-26 RX ADMIN — PROPOFOL 150 MCG/KG/MIN: 10 INJECTION, EMULSION INTRAVENOUS at 08:04

## 2023-04-26 RX ADMIN — SODIUM CHLORIDE: 0.9 INJECTION, SOLUTION INTRAVENOUS at 08:04

## 2023-04-26 RX ADMIN — PROPOFOL 20 MG: 10 INJECTION, EMULSION INTRAVENOUS at 08:04

## 2023-04-26 RX ADMIN — PROPOFOL 80 MG: 10 INJECTION, EMULSION INTRAVENOUS at 08:04

## 2023-04-26 RX ADMIN — SODIUM CHLORIDE, SODIUM LACTATE, POTASSIUM CHLORIDE, AND CALCIUM CHLORIDE: .6; .31; .03; .02 INJECTION, SOLUTION INTRAVENOUS at 07:04

## 2023-04-26 RX ADMIN — ONDANSETRON 4 MG: 2 INJECTION, SOLUTION INTRAMUSCULAR; INTRAVENOUS at 08:04

## 2023-04-26 RX ADMIN — LIDOCAINE HYDROCHLORIDE 75 MG: 20 INJECTION, SOLUTION INTRAVENOUS at 08:04

## 2023-04-26 NOTE — TRANSFER OF CARE
"Anesthesia Transfer of Care Note    Patient: Mauricio Fernandes    Procedure(s) Performed: Procedure(s) (LRB):  COLONOSCOPY (N/A)    Patient location: GI    Anesthesia Type: general    Transport from OR: Transported from OR on room air with adequate spontaneous ventilation    Post pain: adequate analgesia    Post assessment: no apparent anesthetic complications and tolerated procedure well    Post vital signs: stable    Level of consciousness: awake    Nausea/Vomiting: no nausea/vomiting    Complications: none    Transfer of care protocol was followed      Last vitals:   Visit Vitals  /61   Pulse 94   Temp 37 °C (98.6 °F)   Resp 19   Ht 5' 8" (1.727 m)   Wt 120.2 kg (265 lb)   SpO2 97%   BMI 40.29 kg/m²     "

## 2023-04-26 NOTE — H&P
Short Stay Endoscopy History and Physical    PCP - Gabriel De Santiago MD    Procedure - Colonoscopy  ASA - II  Mallampati - per anesthesia  History of Anesthesia problems - no  Family history Anesthesia problems - no     HPI:  This is a 47 y.o. male here for evaluation of : Colon cancer screening    Family history of colon cancer - no  History of polyps - na  Change in bowel habits - no  Rectal bleeding - no      ROS:  Constitutional: No fevers, chills, No weight loss  ENT: No allergies  CV: No chest pain  Pulm: No shortness of breath  GI: see HPI  Derm: No rash    Medical History:  has a past medical history of Frequent PVCs (6/23/2021), Gout, Hypertension, IFG (impaired fasting glucose), and Obstructive sleep apnea.    Surgical History:  has a past surgical history that includes Mc[place] tooth extraction.    Family History: family history includes No Known Problems in his brother, brother, mother, and sister; Prostate cancer in his father.. Otherwise no colon cancer, inflammatory bowel disease, or GI malignancies.    Social History:  reports that he has never smoked. He quit smokeless tobacco use about 9 years ago.  His smokeless tobacco use included chew. He reports current alcohol use. He reports that he does not use drugs.    Review of patient's allergies indicates:   Allergen Reactions    Tylenol [acetaminophen] Swelling     Patient states his face starts to swell.    Tylenol headache plus Swelling       Medications:   Medications Prior to Admission   Medication Sig Dispense Refill Last Dose    atorvastatin (LIPITOR) 20 MG tablet TAKE 1 TABLET(20 MG) BY MOUTH EVERY DAY 90 tablet 3     valsartan (DIOVAN) 160 MG tablet Take 1 tablet (160 mg total) by mouth once daily. 30 tablet 11     verapamiL (VERELAN) 180 MG C24P TAKE 1 CAPSULE(180 MG) BY MOUTH EVERY DAY 30 capsule 11          Objective Findings:    Vital Signs: see nursing notes  Physical Exam:  General Appearance: Well appearing in no acute distress  Eyes:    No  scleral icterus  ENT: Neck supple  Lungs: CTA anteriorly  Heart:  S1, S2 normal, no murmurs heard  Abdomen: Soft, non tender, non distended with positive bowel sounds. No hepatosplenomegaly, ascites, or mass  Extremities: no edema  Skin: No rash      Labs:  Lab Results   Component Value Date    WBC 10.23 03/11/2022    HGB 14.8 03/11/2022    HCT 43.5 03/11/2022     03/11/2022    CHOL 109 (L) 03/11/2022    TRIG 109 03/11/2022    HDL 34 (L) 03/11/2022    ALT 21 03/11/2022    AST 19 03/11/2022     03/11/2022    K 3.8 03/11/2022     03/11/2022    CREATININE 1.0 03/11/2022    BUN 14 03/11/2022    CO2 22 (L) 03/11/2022    TSH 1.327 03/11/2022    PSA 0.44 03/11/2022    HGBA1C 5.8 (H) 03/11/2022       I have explained the risks and benefits of endoscopy procedures to the patient including but not limited to bleeding, perforation, infection, and death.    Elie Harman MD

## 2023-04-26 NOTE — ANESTHESIA PREPROCEDURE EVALUATION
04/26/2023     Mauricio Fernandes is a 47 y.o., male here for colonoscopy    Past Medical History:   Diagnosis Date    Frequent PVCs 6/23/2021    Gout     Hypertension     IFG (impaired fasting glucose)     Obstructive sleep apnea      Past Surgical History:   Procedure Laterality Date    WISDOM TOOTH EXTRACTION             Pre-op Assessment    I have reviewed the Patient Summary Reports.     I have reviewed the Nursing Notes. I have reviewed the NPO Status.      Review of Systems  Anesthesia Hx:  History of prior surgery of interest to airway management or planning:  Denies Personal Hx of Anesthesia complications.   Cardiovascular:   Hypertension    Pulmonary:   Sleep Apnea    Endocrine:   gout       Physical Exam  General: Well nourished    Airway:  Mallampati: II   Mouth Opening: Normal  TM Distance: Normal  Tongue: Normal        Anesthesia Plan  Type of Anesthesia, risks & benefits discussed:    Anesthesia Type: Gen Natural Airway  Informed Consent: Informed consent signed with the Patient and all parties understand the risks and agree with anesthesia plan.  All questions answered.   ASA Score: 2    Ready For Surgery From Anesthesia Perspective.     .

## 2023-04-26 NOTE — TELEPHONE ENCOUNTER
Care Due:                  Date            Visit Type   Department     Provider  --------------------------------------------------------------------------------                                EP -                              PRIMARY      [facility] FAMILY  Last Visit: 04-      CARE ([facility])   MEDICINE       [facility]                              MYCHART                              FOLLOWUP/OF  [facility]  Next Visit: 05-      FICE VISIT   MEDICINE       [facility]                                                            Last  Test          Frequency    Reason                     Performed    Due Date  --------------------------------------------------------------------------------    CMP.........  12 months..  valsartan................  03- 03-    [facility] Embedded Care Gaps. Reference number: [id number]. 4/26/2023   4:42:15 PM CDT

## 2023-04-26 NOTE — PROVATION PATIENT INSTRUCTIONS
Discharge Summary/Instructions after an Endoscopic Procedure  Patient Name: Mauricio Frenandes  Patient MRN: [id number]  Patient YOB: 1975  Wednesday, April 26, 2023  Elie Harman MD  Dear patient,  As a result of recent federal legislation (The Federal Cures Act), you may   receive lab or pathology results from your procedure in your [facility]   account before your physician is able to contact you. Your physician or   their representative will relay the results to you with their   recommendations at their soonest availability.  Thank you,  Your health is very important to us during the Covid Crisis. Following your   procedure today, you will receive a daily text for 2 weeks asking about   signs or symptoms of Covid 19.  Please respond to this text when you   receive it so we can follow up and keep you as safe as possible.   RESTRICTIONS:  During your procedure today, you received medications for sedation.  These   medications may affect your judgment, balance and coordination.  Therefore,   for 24 hours, you have the following restrictions:   - DO NOT drive a car, operate machinery, make legal/financial decisions,   sign important papers or drink alcohol.    ACTIVITY:  Today: no heavy lifting, straining or running due to procedural   sedation/anesthesia.  The following day: return to full activity including work.  DIET:  Eat and drink normally unless instructed otherwise.     TREATMENT FOR COMMON SIDE EFFECTS:  - Mild abdominal pain, nausea, belching, bloating or excessive gas:  rest,   eat lightly and use a heating pad.  - Sore Throat: treat with throat lozenges and/or gargle with warm salt   water.  - Because air was used during the procedure, expelling large amounts of air   from your rectum or belching is normal.  - If a bowel prep was taken, you may not have a bowel movement for 1-3 days.    This is normal.  SYMPTOMS TO WATCH FOR AND REPORT TO YOUR PHYSICIAN:  1. Abdominal pain or  bloating, other than gas cramps.  2. Chest pain.  3. Back pain.  4. Signs of infection such as: chills or fever occurring within 24 hours   after the procedure.  5. Rectal bleeding, which would show as bright red, maroon, or black stools.   (A tablespoon of blood from the rectum is not serious, especially if   hemorrhoids are present.)  6. Vomiting.  7. Weakness or dizziness.  GO DIRECTLY TO THE NEAREST EMERGENCY ROOM IF YOU HAVE ANY OF THE FOLLOWING:      Difficulty breathing              Chills and/or fever over 101 F   Persistent vomiting and/or vomiting blood   Severe abdominal pain   Severe chest pain   Black, tarry stools   Bleeding- more than one tablespoon   Any other symptom or condition that you feel may need urgent attention  Your doctor recommends these additional instructions:  If any biopsies were taken, your doctors clinic will contact you in 1 to 2   weeks with any results.  - Discharge patient to home.   - Resume previous diet.   - Continue present medications.   - Await pathology results.   - Repeat colonoscopy in 3 years for surveillance.   - Patient has a contact number available for emergencies.  The signs and   symptoms of potential delayed complications were discussed with the   patient.  Return to normal activities tomorrow.  Written discharge   instructions were provided to the patient.  For questions, problems or results please call your physician - Elie Harman MD.  EMERGENCY PHONE NUMBER: [phone],  LAB RESULTS: [phone]  IF A COMPLICATION OR EMERGENCY SITUATION ARISES AND YOU ARE UNABLE TO REACH   YOUR PHYSICIAN - GO DIRECTLY TO THE EMERGENCY ROOM.  Elie Harman MD  4/26/2023 9:09:28 AM  This report has been verified and signed electronically.  Dear patient,  As a result of recent federal legislation (The Federal Cures Act), you may   receive lab or pathology results from your procedure in your [facility]   account before your physician is able to  contact you. Your physician or   their representative will relay the results to you with their   recommendations at their soonest availability.  Thank you,  PROVATION

## 2023-04-26 NOTE — ANESTHESIA POSTPROCEDURE EVALUATION
Anesthesia Post Evaluation    Patient: Mauricio Fernandes    Procedure(s) Performed: Procedure(s) (LRB):  COLONOSCOPY (N/A)    Final Anesthesia Type: general      Patient location during evaluation: GI PACU  Patient participation: Yes- Able to Participate  Level of consciousness: awake and alert  Post-procedure vital signs: reviewed and stable  Pain management: adequate  Airway patency: patent    PONV status at discharge: No PONV  Anesthetic complications: no      Cardiovascular status: blood pressure returned to baseline  Respiratory status: unassisted  Hydration status: euvolemic            Vitals Value Taken Time   /54 04/26/23 0935   Temp 37 °C (98.6 °F) 04/26/23 0914   Pulse 84 04/26/23 0935   Resp 17 04/26/23 0935   SpO2 96 % 04/26/23 0935         Event Time   Out of Recovery 09:42:53         Pain/Nayely Score: Nayely Score: 10 (4/26/2023  9:15 AM)

## 2023-04-27 RX ORDER — VALSARTAN 160 MG/1
TABLET ORAL
Qty: 30 TABLET | Refills: 11 | Status: SHIPPED | OUTPATIENT
Start: 2023-04-27 | End: 2023-10-02 | Stop reason: SDUPTHER

## 2023-04-27 NOTE — TELEPHONE ENCOUNTER
Refill Routing Note   Medication(s) are not appropriate for processing by [facility] for the following reason(s):      Required labs outdated    [facility] action(s):  Defer Labs due   Medication Therapy Plan: FOVS      Appointments  past 12m or future 3m with PCP    Date Provider   Last Visit   4/11/2022 Gabriel De Santiago MD   Next Visit   5/23/2023 Gabriel De Santiago MD   ED visits in past 90 days: 0        Note composed:9:05 PM 04/26/2023

## 2023-05-01 LAB
FINAL PATHOLOGIC DIAGNOSIS: NORMAL
Lab: NORMAL

## 2023-06-12 ENCOUNTER — PATIENT OUTREACH (OUTPATIENT)
Dept: ADMINISTRATIVE | Facility: HOSPITAL | Age: 48
End: 2023-06-12
Payer: COMMERCIAL

## 2023-06-12 NOTE — PROGRESS NOTES
Care Everywhere updates requested and reviewed.  Immunizations reconciled. Media reports reviewed.  Duplicate HM overrides and  orders removed.  Overdue HM topic chart audit and/or requested.  Overdue lab testing linked to upcoming lab appointments if applies.        Health Maintenance Due   Topic Date Due    COVID-19 Vaccine (3 - Moderna series) 2021    Hemoglobin A1c (Prediabetes)  2023

## 2023-08-12 ENCOUNTER — OFFICE VISIT (OUTPATIENT)
Dept: URGENT CARE | Facility: CLINIC | Age: 48
End: 2023-08-12
Payer: COMMERCIAL

## 2023-08-12 VITALS
DIASTOLIC BLOOD PRESSURE: 90 MMHG | BODY MASS INDEX: 42.38 KG/M2 | RESPIRATION RATE: 19 BRPM | WEIGHT: 270 LBS | HEART RATE: 69 BPM | OXYGEN SATURATION: 96 % | TEMPERATURE: 99 F | HEIGHT: 67 IN | SYSTOLIC BLOOD PRESSURE: 156 MMHG

## 2023-08-12 DIAGNOSIS — M10.9 ACUTE GOUT OF LEFT ANKLE, UNSPECIFIED CAUSE: Primary | ICD-10-CM

## 2023-08-12 DIAGNOSIS — I10 HYPERTENSION, UNSPECIFIED TYPE: ICD-10-CM

## 2023-08-12 DIAGNOSIS — M10.9 ACUTE GOUT OF RIGHT ANKLE, UNSPECIFIED CAUSE: ICD-10-CM

## 2023-08-12 PROCEDURE — 99214 PR OFFICE/OUTPT VISIT, EST, LEVL IV, 30-39 MIN: ICD-10-PCS | Mod: 25,S$GLB,, | Performed by: PHYSICIAN ASSISTANT

## 2023-08-12 PROCEDURE — 99214 OFFICE O/P EST MOD 30 MIN: CPT | Mod: 25,S$GLB,, | Performed by: PHYSICIAN ASSISTANT

## 2023-08-12 PROCEDURE — 96372 PR INJECTION,THERAP/PROPH/DIAG2ST, IM OR SUBCUT: ICD-10-PCS | Mod: S$GLB,,, | Performed by: EMERGENCY MEDICINE

## 2023-08-12 PROCEDURE — 96372 THER/PROPH/DIAG INJ SC/IM: CPT | Mod: S$GLB,,, | Performed by: EMERGENCY MEDICINE

## 2023-08-12 RX ORDER — KETOROLAC TROMETHAMINE 30 MG/ML
30 INJECTION, SOLUTION INTRAMUSCULAR; INTRAVENOUS
Status: COMPLETED | OUTPATIENT
Start: 2023-08-12 | End: 2023-08-12

## 2023-08-12 RX ORDER — INDOMETHACIN 50 MG/1
50 CAPSULE ORAL 3 TIMES DAILY PRN
Qty: 30 CAPSULE | Refills: 1 | Status: SHIPPED | OUTPATIENT
Start: 2023-08-12

## 2023-08-12 RX ORDER — DICLOFENAC SODIUM 10 MG/G
2 GEL TOPICAL 3 TIMES DAILY PRN
Qty: 100 G | Refills: 1 | Status: SHIPPED | OUTPATIENT
Start: 2023-08-12 | End: 2023-10-02

## 2023-08-12 RX ORDER — LIDOCAINE 50 MG/G
1 PATCH TOPICAL DAILY
Qty: 30 PATCH | Refills: 1 | Status: SHIPPED | OUTPATIENT
Start: 2023-08-12

## 2023-08-12 RX ADMIN — KETOROLAC TROMETHAMINE 30 MG: 30 INJECTION, SOLUTION INTRAMUSCULAR; INTRAVENOUS at 02:08

## 2023-08-12 NOTE — PROGRESS NOTES
"Subjective:      Patient ID: Mauricio Fernandes is a 47 y.o. male.    Vitals:  height is 5' 7" (1.702 m) and weight is 122.5 kg (270 lb). His temperature is 98.6 °F (37 °C). His blood pressure is 156/90 (abnormal) and his pulse is 69. His respiration is 19 and oxygen saturation is 96%.   Body mass index is 42.29 kg/m².      Chief Complaint: Other Misc (Gout flare up - Entered by patient) and Pain (Gout flareup started 3 days ago. )    47-year-old  with a history of already vaccinated for COVID-19, IFG, hypertension, gout, obesity, and CHETAN who presents urgent care clinic for evaluation.  Reports gout flare started 3 days ago.  He tried to treat with OTC ibuprofen with minimal relief.  Last dose was over 12 hours ago.  Pain is flaring up in his left outer ankle and feet more so than right.  He did schedule follow-up with PCP in the next 3-4 weeks for evaluation since this is 2nd gout flare in 2 weeks.  Two weeks ago he was seen at [facility] urgent care clinic and was given steroid shot with 5 day oral steroid course.  Denies any new food exposure, ETOH use, fever, chills, open wound, or injury/falls.      Medical assistant note:  Gout flare up in both ankles and feet.    Pain  This is a new problem. Episode onset: 3. The problem has been gradually worsening. Associated symptoms include arthralgias. Pertinent negatives include no abdominal pain, chest pain, chills, congestion, coughing, diaphoresis, fatigue, fever, headaches, joint swelling, myalgias, nausea, neck pain, numbness, rash, sore throat, vertigo or vomiting. The symptoms are aggravated by walking. He has tried NSAIDs (ibuprofen) for the symptoms.       Constitution: Negative for activity change, chills, sweating, fatigue, fever and generalized weakness.   HENT:  Negative for ear pain, hearing loss, facial swelling, congestion, postnasal drip, sinus pain, sinus pressure, sore throat, trouble swallowing and voice change.    Neck: Negative for neck " pain, neck stiffness and painful lymph nodes.   Cardiovascular:  Negative for chest pain, leg swelling, palpitations, sob on exertion and passing out.   Eyes:  Negative for eye discharge, eye pain, eye redness, photophobia, vision loss, double vision, blurred vision and eyelid swelling.   Respiratory:  Negative for chest tightness, cough, sputum production, COPD, shortness of breath, wheezing and asthma.    Gastrointestinal:  Negative for abdominal pain, nausea, vomiting, diarrhea, bright red blood in stool, dark colored stools, rectal bleeding, heartburn and bowel incontinence.   Genitourinary:  Negative for dysuria, frequency, urgency, urine decreased, flank pain, bladder incontinence, hematuria and history of kidney stones.   Musculoskeletal:  Positive for joint pain and gout. Negative for trauma, joint swelling, abnormal ROM of joint, muscle cramps and muscle ache.   Skin:  Negative for color change, pale, rash and wound.   Allergic/Immunologic: Negative for seasonal allergies, asthma and immunocompromised state.   Neurological:  Negative for dizziness, history of vertigo, light-headedness, passing out, facial drooping, speech difficulty, coordination disturbances, loss of balance, headaches, disorientation, altered mental status, loss of consciousness, numbness, tingling and seizures.   Hematologic/Lymphatic: Negative for swollen lymph nodes, easy bruising/bleeding and trouble clotting. Does not bruise/bleed easily.   Psychiatric/Behavioral:  Negative for altered mental status and disorientation.       Past Medical History:   Diagnosis Date    Frequent PVCs 6/23/2021    Gout     Hypertension     IFG (impaired fasting glucose)     Obstructive sleep apnea        Objective:     Physical Exam   Constitutional: He appears well-developed. He is cooperative.  Non-toxic appearance. He does not appear ill. No distress. obesity  HENT:   Head: Normocephalic and atraumatic.   Ears:   Right Ear: Hearing, external ear and  ear canal normal.   Left Ear: Hearing, external ear and ear canal normal.   Nose: Nose normal.   Eyes: Conjunctivae and EOM are normal. Pupils are equal, round, and reactive to light. Right eye exhibits no discharge. Left eye exhibits no discharge. Right eye exhibits normal extraocular motion. Left eye exhibits normal extraocular motion. Extraocular movement intact vision grossly intact gaze aligned appropriately   Neck: Phonation normal. Neck supple.   Cardiovascular: Normal rate and regular rhythm.   Pulmonary/Chest: Effort normal. No accessory muscle usage. No respiratory distress. He has no wheezes.   Abdominal: Normal appearance. He exhibits no distension.   Musculoskeletal:         General: Tenderness present. No swelling, deformity or signs of injury.      Right lower leg: No edema.      Left lower leg: No edema.      Comments: Moves all extremities with normal tone, strength, and ROM.    Gait normal.    Left greater than right lateral ankle pain on palpation.  No erythema, swelling, warmth, or induration.  No ankle joint laxity present bilaterally.   Neurological: no focal deficit. He is alert and at baseline. He has normal motor skills. He displays no weakness, facial symmetry, normal reflexes and no dysarthria. No cranial nerve deficit or sensory deficit. He exhibits normal muscle tone. Gait and coordination normal. Coordination normal.   Skin: Skin is warm, dry, intact, not diaphoretic and no rash. Capillary refill takes less than 2 seconds.   Psychiatric: His speech is normal and behavior is normal. Mood, affect, judgment and thought content normal.   Nursing note and vitals reviewed.      Assessment:     1. Acute gout of left ankle, unspecified cause    2. Acute gout of right ankle, unspecified cause    3. Hypertension, unspecified type      Note dictated with voice recognition software, please excuse any grammatical errors.    Patient presents with clinical exam findings and history consistent with  above.     On exam, patient is nontoxic appearing and vitals are stable.  Patient is essentially neurovascularly intact on exam.      Diagnostic testing results were independently reviewed and interpreted, which were discussed in depth with patient.   Test ordered in clinic: none  Additionally, previous progress notes/admissions/lab were reviewed and interpreted.  CMP 03/11/2022 Good kidney function and EGFR.  Uric acid 03/11/2022 elevated at 9.7  [place] [facility] [place]  PCP progress note outside facility 04/11/2022 reviewed.    Plan:     Out of window for colchicine treatment for acute gout.  Received IM and oral steroid within 2 weeks of previous gout flare.  Discussed holding off on any additional systemic steroid treatment this time.  Will treat his symptoms.  Recommend close follow-up PCP to consider initiating daily gout medications since to gout flare in less than 1 month.    Acute gout of left ankle, unspecified cause  -     ketorolac injection 30 mg  -     indomethacin (INDOCIN) 50 MG capsule; Take 1 capsule (50 mg total) by mouth 3 (three) times daily as needed (Take with food for gout pain flare; do not take more than 7 days.).  Dispense: 30 capsule; Refill: 1  -     LIDOcaine (LIDODERM) 5 %; Place 1 patch onto the skin once daily. Remove & Discard patch within 12 hours or as directed by MD. For muscle and joint pain.  Dispense: 30 patch; Refill: 1  -     diclofenac sodium (VOLTAREN) 1 % Gel; Apply 2 g topically 3 (three) times daily as needed (muscle and joint pain).  Dispense: 100 g; Refill: 1    Acute gout of right ankle, unspecified cause  -     ketorolac injection 30 mg  -     indomethacin (INDOCIN) 50 MG capsule; Take 1 capsule (50 mg total) by mouth 3 (three) times daily as needed (Take with food for gout pain flare; do not take more than 7 days.).  Dispense: 30 capsule; Refill: 1  -     LIDOcaine (LIDODERM) 5 %; Place 1 patch onto the skin once daily. Remove &  Discard patch within 12 hours or as directed by MD. For muscle and joint pain.  Dispense: 30 patch; Refill: 1  -     diclofenac sodium (VOLTAREN) 1 % Gel; Apply 2 g topically 3 (three) times daily as needed (muscle and joint pain).  Dispense: 100 g; Refill: 1    Hypertension, unspecified type        - blood pressure in clinic today 156/90.  Likely pain contributing.  Has not taking his hypertensive meds today.  Recommend continuing daily verapamil 180 mg and valsartan 160 mg.  Close monitoring and follow up with PCP for chronic medical management.      Note dictated with voice recognition software, please excuse any grammatical errors.    We had shared decision making for patient's treatment. We discussed side effects/alternatives/benefits/risk and patient would like to proceed with treatment plan. We also discussed other OTC treatment recommendations.    Patient was counseled expected course and answered all of questions.     Patient was instructed to return for re-evaluation with urgent care or PCP for continued outpatient workup and management if symptoms do not improve/worsening symptoms. Strict ED versus clinic precautions given in depth.   Guideline, discharge and follow-up instructions given verbally/printed; patient will also receive via [employer]. Patient verbalized understanding and agreed with the entirety of plan of care.         Additional MDM:     Heart Failure Score:   COPD = No      Patient Instructions   PLEASE READ YOUR DISCHARGE INSTRUCTIONS ENTIRELY AS IT CONTAINS IMPORTANT INFORMATION.  - do not use OTC anti-inflammatory if taking prescribed (Rx) anti-inflammatory; start Rx inflammatory tomorrow.  Because you were given a concentrated anti-inflammatory injection in clinic.  - Drink plenty of fluids.  Take anti-inflammatory with food to prevent any GI distress.  - contact your PCP to discuss starting daily treatment medication to prevent recurrent gout attacks.    - OTC TOPICAL meds for pain  reliever :  -You can apply OTC diclofenac or Volatren Gel 2-3 times daily to muscle or joints.  -You can also apply OTC lidocaine 4-5% with OR without menthol ointment 2-3 daily to muscle or joints.  -Please let one absorb before using the other. Do not use both at the same time as it may decrease efficacy. Please stop using if you develop any skin rash or irritation.  -Please wash your hands after application of these topical products.   - do not use OTC anti-inflammatory if taking prescribed (Rx) anti-inflammatory. Start Rx inflammatory in 6-12 hours from clinic visit because you were given a concentrated anti-inflammatory injection in clinic.  - continue heat (muscle) /ice (bone/joint) compression, rice therapy, and muscle stretches.   - Radiographs and all diagnostic testing reviewed with patient  - if no improvement or worsening symptoms, recommend follow-up with * PCP for further evaluation.    - discussed weight loss given obesity  -You must understand that you've received an Urgent Care treatment only and that you may be released before all your medical problems are known or treated. You, the patient, will arrange for follow up care as instructed. Please arrange follow up with your primary medical clinic within 2-5 days if your signs and symptoms have not resolved or worsen.   - If your condition worsens or fails to improve we recommend that you receive another evaluation at the emergency room immediately or contact your primary medical clinic to discuss your concerns in next 2-5 days.  Strict ER versus clinic precautions given.      RED FLAGS/WARNING SYMPTOMS DISCUSSED WITH PATIENT THAT WOULD WARRANT EMERGENT MEDICAL ATTENTION. Patient aware and verbalized understanding.      RICE     Rest an injury, elevate it, and use ice and compression as directed.   RICE stands for rest, ice, compression, and elevation. These can limit pain and swelling after an injury. RICE may be recommended to help treat fractures,  sprains, strains, and bruises or bumps.   Home care  The following explain the details of RICE:  Rest. Limit the use of the injured body part. This helps prevent further damage to the body part and gives it time to heal. In some cases, you may need a sling, brace, splint, or cast to help keep the body part still until it has healed.  Ice. Applying ice right after an injury helps relieve pain and swelling. Wrap a bag of ice in a thin towel. Then, place it over the injured area. Do this for 10 to 15 minutes every 3 to 4 hours. Continue for the next 1 to 3 days or until your symptoms improve. Never put ice directly on your skin or ice an area longer than 15 minutes at a time.  Compression. Putting pressure on an injury helps reduce swelling and provides support. Wrap the injured area firmly with an elastic bandage/wrap. Make sure not to wrap the bandage too tightly or you will cut off blood flow to the injured area. If your bandage loosens, rewrap it.  Elevation. Keeping an injury raised above the level of your heart reduces swelling, pain, and throbbing. For instance, if you have a broken leg, it may help to rest your leg on several pillows when sitting or lying down. Try to keep the injured area elevated for at least 2 to 3 hours per day.  Follow-up care  Follow up with your healthcare provider, or as advised.  When to seek medical advice  Call your healthcare provider right away if any of these occur:  Fever of 100.4°F (38°C) or higher, or as directed by your healthcare provider  Increased pain or swelling in the injured body part  Injured body part becomes cold, blue, numb, or tingly  Signs of infection. These include warmth in the skin, redness, drainage, or bad smell coming from the injured body part.  Date Last Reviewed: 1/18/2016  © 1281-3780 [employer]. [address] [place] [address], [place] [address]. All rights reserved. This information is not intended as a substitute for professional medical care.  Always follow your healthcare professional's instructions.

## 2023-08-12 NOTE — PATIENT INSTRUCTIONS
PLEASE READ YOUR DISCHARGE INSTRUCTIONS ENTIRELY AS IT CONTAINS IMPORTANT INFORMATION.  - do not use OTC anti-inflammatory if taking prescribed (Rx) anti-inflammatory; start Rx inflammatory tomorrow.  Because you were given a concentrated anti-inflammatory injection in clinic.  - Drink plenty of fluids.  Take anti-inflammatory with food to prevent any GI distress.  - contact your PCP to discuss starting daily treatment medication to prevent recurrent gout attacks.    - OTC TOPICAL meds for pain reliever :  -You can apply OTC diclofenac or Volatren Gel 2-3 times daily to muscle or joints.  -You can also apply OTC lidocaine 4-5% with OR without menthol ointment 2-3 daily to muscle or joints.  -Please let one absorb before using the other. Do not use both at the same time as it may decrease efficacy. Please stop using if you develop any skin rash or irritation.  -Please wash your hands after application of these topical products.   - do not use OTC anti-inflammatory if taking prescribed (Rx) anti-inflammatory. Start Rx inflammatory in 6-12 hours from clinic visit because you were given a concentrated anti-inflammatory injection in clinic.  - continue heat (muscle) /ice (bone/joint) compression, rice therapy, and muscle stretches.   - Radiographs and all diagnostic testing reviewed with patient  - if no improvement or worsening symptoms, recommend follow-up with * PCP for further evaluation.    - discussed weight loss given obesity  -You must understand that you've received an Urgent Care treatment only and that you may be released before all your medical problems are known or treated. You, the patient, will arrange for follow up care as instructed. Please arrange follow up with your primary medical clinic within 2-5 days if your signs and symptoms have not resolved or worsen.   - If your condition worsens or fails to improve we recommend that you receive another evaluation at the emergency room immediately or contact  your primary medical clinic to discuss your concerns in next 2-5 days.  Strict ER versus clinic precautions given.      RED FLAGS/WARNING SYMPTOMS DISCUSSED WITH PATIENT THAT WOULD WARRANT EMERGENT MEDICAL ATTENTION. Patient aware and verbalized understanding.      RICE     Rest an injury, elevate it, and use ice and compression as directed.   RICE stands for rest, ice, compression, and elevation. These can limit pain and swelling after an injury. RICE may be recommended to help treat fractures, sprains, strains, and bruises or bumps.   Home care  The following explain the details of RICE:  Rest. Limit the use of the injured body part. This helps prevent further damage to the body part and gives it time to heal. In some cases, you may need a sling, brace, splint, or cast to help keep the body part still until it has healed.  Ice. Applying ice right after an injury helps relieve pain and swelling. Wrap a bag of ice in a thin towel. Then, place it over the injured area. Do this for 10 to 15 minutes every 3 to 4 hours. Continue for the next 1 to 3 days or until your symptoms improve. Never put ice directly on your skin or ice an area longer than 15 minutes at a time.  Compression. Putting pressure on an injury helps reduce swelling and provides support. Wrap the injured area firmly with an elastic bandage/wrap. Make sure not to wrap the bandage too tightly or you will cut off blood flow to the injured area. If your bandage loosens, rewrap it.  Elevation. Keeping an injury raised above the level of your heart reduces swelling, pain, and throbbing. For instance, if you have a broken leg, it may help to rest your leg on several pillows when sitting or lying down. Try to keep the injured area elevated for at least 2 to 3 hours per day.  Follow-up care  Follow up with your healthcare provider, or as advised.  When to seek medical advice  Call your healthcare provider right away if any of these occur:  Fever of 100.4°F  (38°C) or higher, or as directed by your healthcare provider  Increased pain or swelling in the injured body part  Injured body part becomes cold, blue, numb, or tingly  Signs of infection. These include warmth in the skin, redness, drainage, or bad smell coming from the injured body part.  Date Last Reviewed: 1/18/2016  © 8042-2916 [employer]. [address] [place] [address]. All rights reserved. This information is not intended as a substitute for professional medical care. Always follow your healthcare professional's instructions.

## 2023-08-31 ENCOUNTER — PATIENT OUTREACH (OUTPATIENT)
Dept: ADMINISTRATIVE | Facility: HOSPITAL | Age: 48
End: 2023-08-31
Payer: COMMERCIAL

## 2023-08-31 NOTE — PROGRESS NOTES
Care Everywhere updates requested and reviewed.  Immunizations reconciled. Media reports reviewed.  Duplicate HM overrides and  orders removed.  Overdue HM topic chart audit and/or requested.  Overdue lab testing linked to upcoming lab appointments if applies.        Health Maintenance Due   Topic Date Due    COVID-19 Vaccine (3 - Moderna series) 2021    Hemoglobin A1c (Prediabetes)  2023    Shingles Vaccine (1 of 2) Never done

## 2023-10-02 ENCOUNTER — OFFICE VISIT (OUTPATIENT)
Dept: FAMILY MEDICINE | Facility: CLINIC | Age: 48
End: 2023-10-02
Attending: FAMILY MEDICINE
Payer: COMMERCIAL

## 2023-10-02 VITALS
DIASTOLIC BLOOD PRESSURE: 84 MMHG | BODY MASS INDEX: 43.95 KG/M2 | SYSTOLIC BLOOD PRESSURE: 138 MMHG | OXYGEN SATURATION: 95 % | HEIGHT: 67 IN | WEIGHT: 280 LBS | HEART RATE: 62 BPM

## 2023-10-02 DIAGNOSIS — G47.33 OSA (OBSTRUCTIVE SLEEP APNEA): ICD-10-CM

## 2023-10-02 DIAGNOSIS — Z12.5 ENCOUNTER FOR SCREENING FOR MALIGNANT NEOPLASM OF PROSTATE: ICD-10-CM

## 2023-10-02 DIAGNOSIS — E78.5 HYPERLIPIDEMIA, UNSPECIFIED HYPERLIPIDEMIA TYPE: ICD-10-CM

## 2023-10-02 DIAGNOSIS — R73.01 IFG (IMPAIRED FASTING GLUCOSE): ICD-10-CM

## 2023-10-02 DIAGNOSIS — I49.3 FREQUENT PVCS: ICD-10-CM

## 2023-10-02 DIAGNOSIS — Z00.00 ROUTINE GENERAL MEDICAL EXAMINATION AT A HEALTH CARE FACILITY: Primary | ICD-10-CM

## 2023-10-02 DIAGNOSIS — R53.83 FATIGUE, UNSPECIFIED TYPE: ICD-10-CM

## 2023-10-02 DIAGNOSIS — M1A.9XX0 CHRONIC GOUT WITHOUT TOPHUS, UNSPECIFIED CAUSE, UNSPECIFIED SITE: ICD-10-CM

## 2023-10-02 DIAGNOSIS — I10 HYPERTENSION, UNSPECIFIED TYPE: ICD-10-CM

## 2023-10-02 PROCEDURE — 4010F PR ACE/ARB THEARPY RXD/TAKEN: ICD-10-PCS | Mod: CPTII,S$GLB,, | Performed by: FAMILY MEDICINE

## 2023-10-02 PROCEDURE — 1160F RVW MEDS BY RX/DR IN RCRD: CPT | Mod: CPTII,S$GLB,, | Performed by: FAMILY MEDICINE

## 2023-10-02 PROCEDURE — 3079F PR MOST RECENT DIASTOLIC BLOOD PRESSURE 80-89 MM HG: ICD-10-PCS | Mod: CPTII,S$GLB,, | Performed by: FAMILY MEDICINE

## 2023-10-02 PROCEDURE — 99999 PR PBB SHADOW E&M-EST. PATIENT-LVL III: CPT | Mod: PBBFAC,,, | Performed by: FAMILY MEDICINE

## 2023-10-02 PROCEDURE — 1159F MED LIST DOCD IN RCRD: CPT | Mod: CPTII,S$GLB,, | Performed by: FAMILY MEDICINE

## 2023-10-02 PROCEDURE — 4010F ACE/ARB THERAPY RXD/TAKEN: CPT | Mod: CPTII,S$GLB,, | Performed by: FAMILY MEDICINE

## 2023-10-02 PROCEDURE — 1159F PR MEDICATION LIST DOCUMENTED IN MEDICAL RECORD: ICD-10-PCS | Mod: CPTII,S$GLB,, | Performed by: FAMILY MEDICINE

## 2023-10-02 PROCEDURE — 3075F SYST BP GE 130 - 139MM HG: CPT | Mod: CPTII,S$GLB,, | Performed by: FAMILY MEDICINE

## 2023-10-02 PROCEDURE — 99396 PR PREVENTIVE VISIT,EST,40-64: ICD-10-PCS | Mod: S$GLB,,, | Performed by: FAMILY MEDICINE

## 2023-10-02 PROCEDURE — 3079F DIAST BP 80-89 MM HG: CPT | Mod: CPTII,S$GLB,, | Performed by: FAMILY MEDICINE

## 2023-10-02 PROCEDURE — 3075F PR MOST RECENT SYSTOLIC BLOOD PRESS GE 130-139MM HG: ICD-10-PCS | Mod: CPTII,S$GLB,, | Performed by: FAMILY MEDICINE

## 2023-10-02 PROCEDURE — 1160F PR REVIEW ALL MEDS BY PRESCRIBER/CLIN PHARMACIST DOCUMENTED: ICD-10-PCS | Mod: CPTII,S$GLB,, | Performed by: FAMILY MEDICINE

## 2023-10-02 PROCEDURE — 3008F BODY MASS INDEX DOCD: CPT | Mod: CPTII,S$GLB,, | Performed by: FAMILY MEDICINE

## 2023-10-02 PROCEDURE — 99999 PR PBB SHADOW E&M-EST. PATIENT-LVL III: ICD-10-PCS | Mod: PBBFAC,,, | Performed by: FAMILY MEDICINE

## 2023-10-02 PROCEDURE — 99396 PREV VISIT EST AGE 40-64: CPT | Mod: S$GLB,,, | Performed by: FAMILY MEDICINE

## 2023-10-02 PROCEDURE — 3008F PR BODY MASS INDEX (BMI) DOCUMENTED: ICD-10-PCS | Mod: CPTII,S$GLB,, | Performed by: FAMILY MEDICINE

## 2023-10-02 RX ORDER — VERAPAMIL HYDROCHLORIDE 180 MG/1
180 CAPSULE, EXTENDED RELEASE ORAL DAILY
Qty: 90 CAPSULE | Refills: 3 | Status: SHIPPED | OUTPATIENT
Start: 2023-10-02

## 2023-10-02 RX ORDER — ATORVASTATIN CALCIUM 20 MG/1
20 TABLET, FILM COATED ORAL DAILY
Qty: 90 TABLET | Refills: 3 | Status: SHIPPED | OUTPATIENT
Start: 2023-10-02

## 2023-10-02 RX ORDER — ALLOPURINOL 100 MG/1
100 TABLET ORAL DAILY
Qty: 90 TABLET | Refills: 3 | Status: SHIPPED | OUTPATIENT
Start: 2023-10-02

## 2023-10-02 RX ORDER — VALSARTAN 160 MG/1
160 TABLET ORAL DAILY
Qty: 90 TABLET | Refills: 3 | Status: SHIPPED | OUTPATIENT
Start: 2023-10-02

## 2023-10-02 NOTE — PROGRESS NOTES
Subjective     Patient ID: Mauricio Fernandes is a 48 y.o. male.    Chief Complaint: Annual Exam and Gout    48 yr old pleasant male with hypertension, chronic gout, CHETAN, obesity and no other significant medical history presents today as new patient and his annual wellness check and lab work. No new complaints today.    Fatigue - chronic - want his T checked    HTN - controlled - on valsartan and verapamil - compliant - no side effects     CHETAN - controlled    Gout - high uric acid - not on allopurinol - reports some flare up in the past    History as below - reviewed     Hypertension  This is a chronic problem. The current episode started more than 1 year ago. The problem has been gradually improving since onset. The problem is controlled. Pertinent negatives include no anxiety, chest pain, headaches, malaise/fatigue, neck pain, palpitations, peripheral edema, PND or sweats. There are no associated agents to hypertension. Risk factors for coronary artery disease include obesity and male gender. Past treatments include calcium channel blockers and angiotensin blockers. The current treatment provides significant improvement. There are no compliance problems.  There is no history of angina, CAD/MI, CVA, left ventricular hypertrophy or PVD. There is no history of chronic renal disease, hyperaldosteronism, hyperparathyroidism, a hypertension causing med, renovascular disease or a thyroid problem.     Review of Systems   Constitutional: Negative.  Negative for activity change, diaphoresis, malaise/fatigue and unexpected weight change.   HENT: Negative.  Negative for nasal congestion, ear pain, hearing loss, mouth sores, rhinorrhea, trouble swallowing and voice change.    Eyes: Negative.  Negative for pain, discharge and visual disturbance.   Respiratory: Negative.  Negative for apnea, cough, chest tightness and wheezing.    Cardiovascular: Negative.  Negative for chest pain, palpitations and PND.   Gastrointestinal:  Negative.  Negative for abdominal distention, anal bleeding, blood in stool, constipation, diarrhea and vomiting.   Endocrine: Negative.  Negative for cold intolerance and polyuria.   Genitourinary: Negative.  Negative for decreased urine volume, difficulty urinating, discharge, dysuria, frequency, hematuria, scrotal swelling and urgency.   Musculoskeletal: Negative.  Negative for arthralgias, back pain, joint swelling, leg pain, myalgias, neck pain and neck stiffness.   Integumentary:  Negative for color change and rash. Negative.   Allergic/Immunologic: Negative.  Negative for environmental allergies.   Neurological: Negative.  Negative for dizziness, speech difficulty, weakness, light-headedness, numbness and headaches.   Hematological: Negative.    Psychiatric/Behavioral: Negative.  Negative for agitation, confusion, dysphoric mood and suicidal ideas. The patient is not nervous/anxious.      Past Medical History:   Diagnosis Date    Frequent PVCs 6/23/2021    Gout     Hypertension     IFG (impaired fasting glucose)     Obstructive sleep apnea        Past Surgical History:   Procedure Laterality Date    COLONOSCOPY N/A 4/26/2023    Procedure: COLONOSCOPY;  Surgeon: Elie Harman MD;  Location: [facility];  Service: Endoscopy;  Laterality: N/A;    WISDOM TOOTH EXTRACTION         Family History   Problem Relation Age of Onset    No Known Problems Mother     Prostate cancer Father         60s    No Known Problems Sister     No Known Problems Brother     No Known Problems Brother     Colon cancer Neg Hx     Heart disease Neg Hx     Diabetes Neg Hx        Social History     Socioeconomic History    Marital status: Single   Tobacco Use    Smoking status: Never    Smokeless tobacco: Former     Types: Chew     Quit date: 1/2/2014   Substance and Sexual Activity    Alcohol use: Yes     Comment: 1-2 per week.    Drug use: No    Sexual activity: Not Currently     Partners: Female   Social History Narrative     "Lifestyle choices to lower blood pressure?        Diet?    DASH diet-high in fruits/vegetables and low in fat and saturated fat: 8-14 points?    Salt restriction--2.3 grams sodium/day: 2-8 points?        Weight loss-5-20 points per 20 lbs lost.?        Exercise-30min most days/wk: 4-9 points?        Limit Alcohol-2/day men; 1/day women: 2-4 points.                The DASH Diet: Healthy Eating to Control Your Blood Pressure         What is the DASH diet?        DASH stands for Dietary Approaches to Stop Hypertension. It is a balanced eating plan that your family doctor might recommend to help you lower your blood pressure. The DASH diet:    Is low in salt, saturated fat, cholesterol and total fat.     Emphasizes fruits, vegetables, and fat-free or low-fat milk and milk products.     Includes whole grains, fish, poultry and nuts.     Limits the amount of red meat, sweets, added sugars and sugar-containing beverages in your diet.     Is rich in potassium, magnesium and calcium, as well as protein and fiber.         How can the DASH diet help me stay healthy?    Getting too much sodium (salt) in your diet can contribute to high blood pressure (also called hypertension). Some salt is in foods naturally, and some salt is added to food when it is processed or prepared. Following the DASH diet can help you lower your blood pressure, or prevent high blood pressure, by reducing the amount of sodium in your diet to less than 2,300 mg per day.    The fruits, vegetables and whole grains recommended in the DASH diet provide many other elements of a healthy diet, such as lycopene, beta-carotene and isoflavones. These can help protect your body against common health conditions, such as cancer, osteoporosis, stroke and diabetes. Following the DASH diet can also help reduce your risk of heart disease by lowering your low-density lipoprotein (LDL, or "bad") cholesterol level.    Following the DASH diet may drop your blood pressure by " a few points in as little as 2 weeks. However, you should not stop taking your blood pressure medicine, or any other prescribed medicine, without talking to your doctor first.        What kinds of foods are included in the DASH diet?    The DASH diet is nutritionally balanced for good health. You don't need to buy any special foods or pills, or cook with any special recipes, to follow the DASH diet. If you follow the DASH diet, you will eat about 2,000 calories each day. These calories will come from a variety of foods.        Where is sodium in my diet?    Food Serving Sodium Content    ¼ teaspoon table salt 575 mg    ½ teaspoon table salt 1,150 mg    1 teaspoon table salt 2,300 mg    1 hot dog 460 mg    1 regular fast-food hamburger 600 mg    2 ounces processed cheese 600 mg    1 tablespoon soy sauce 900 mg    1 serving frozen pizza with meat and vegetables 982 mg    8 ounces regular potato chips 1,192 mg        The DASH diet    Whole grains (6 to 8 servings a day)     Vegetables (4 to 5 servings a day)     Fruits (4 to 5 servings a day)     Low-fat or fat-free milk and milk products (2 to 3 servings a day)     Lean meats, poultry and fish (6 or fewer servings a day)     Nuts, seeds and beans (4 to 5 servings a week)     Fats and oils (2 to 3 servings a day)     Sweets, preferably low-fat or fat-free (5 or fewer a week)     Sodium (no more than 2,300 mg a day)     You can adapt the DASH diet to meet your needs. For example:    If you drink alcohol, limit yourself to 2 drinks or less per day for men and 1 drink or less per day for women.     To reduce your blood pressure even more, replace some of the carbohydrates in the DASH diet with low-fat protein and unsaturated fats.     If you need to lose weight, reduce the number of calories you eat to about 1,600 per day.     Follow a lower-sodium version (no more than 1,500 mg daily) if you are 40 years of age or older, you are  or you already have high  "blood pressure.         How can I change my eating habits?    Don't be discouraged if following the DASH diet is difficult at first. Start with small, achievable goals. The following ideas can help you make healthy changes:    Pay attention to your current eating habits. Make a list of everything you eat for 2 or 3 days. Compare this list to the DASH diet recommendations above and see what changes you need to make in your diet.     Make one change at a time. For example, start by choosing lower-fat versions of your favorite foods or adding more whole grains to your meals.     Learn what makes a serving for each type of food. For example, 1 serving equals 1 slice of bread, 8 ounces of milk, 1 cup of raw vegetables or 1/2 cup of cooked vegetables. For more serving sizes, go to the [facility] site. Don't have a measuring cup? One serving (3 ounces) of meat or poultry is about the size of a deck of cards. One serving (1/2 cup) of rice or potato looks like half a baseball, and a serving of cheese is about the size of 4 stacked dice.     If eating more fruits and vegetables gives you gas, bloating or diarrhea, increase these foods slowly. You can also talk to your family doctor about taking over-the-counter medicines to reduce these symptoms until your body adjusts.     Get more exercise. Physical activity helps lower your blood pressure and can help you lose more weight.     Use salt-free seasonings, such as spices and herbs, to add flavor to your recipes and reduce or eliminate salt.     Include as many fresh and unprocessed foods as possible. Cut back on frozen dinners, packaged mixes, canned soups and bottled salad dressings, which are often high in sodium.     When buying canned, frozen or processed foods, check nutrition labels for the amount of sodium, sugar and saturated fat. Look for the phrases "no salt added," "sodium-free," "low sodium" or "very low sodium" on food packages. Choose foods with monounsaturated and " polyunsaturated fats.     Steam, grill, poach, roast or stir-hoyos foods. Use low-sodium broth or water instead of butter or oil for sautéing.     When you eat at a restaurant, ask how foods are prepared. Ask if your order can be made without added salt. Don't add salty condiments, such as ketchup, mustard, pickles or sauces, to your food.     Other Organizations    Centers for Disease Control and Prevention     National Heart, Lung, and [employer] [place]     Written by [email or web address] editorial staff    Reviewed/Updated: 02/11    Created: 08/09                Current Outpatient Medications   Medication Sig Dispense Refill    indomethacin (INDOCIN) 50 MG capsule Take 1 capsule (50 mg total) by mouth 3 (three) times daily as needed (Take with food for gout pain flare; do not take more than 7 days.). 30 capsule 1    LIDOcaine (LIDODERM) 5 % Place 1 patch onto the skin once daily. Remove & Discard patch within 12 hours or as directed by MD. For muscle and joint pain. 30 patch 1    allopurinoL (ZYLOPRIM) 100 MG tablet Take 1 tablet (100 mg total) by mouth once daily. 90 tablet 3    atorvastatin (LIPITOR) 20 MG tablet Take 1 tablet (20 mg total) by mouth once daily. 90 tablet 3    valsartan (DIOVAN) 160 MG tablet Take 1 tablet (160 mg total) by mouth once daily. 90 tablet 3    verapamiL (VERELAN) 180 MG C24P Take 1 capsule (180 mg total) by mouth once daily. 90 capsule 3     No current facility-administered medications for this visit.       Review of patient's allergies indicates:   Allergen Reactions    Tylenol [acetaminophen] Swelling     Patient states his face starts to swell.    Tylenol headache plus Swelling            Objective   Vitals:    10/02/23 1001   BP: 138/84   Pulse: 62       Physical Exam  Constitutional:       Appearance: He is well-developed.   HENT:      Head: Normocephalic and atraumatic.      Right Ear: External ear normal.      Left Ear: External ear normal.      Nose: Nose normal.      Mouth/Throat:       Pharynx: No oropharyngeal exudate.   Eyes:      General: No scleral icterus.        Right eye: No discharge.         Left eye: No discharge.      Conjunctiva/sclera: Conjunctivae normal.      Pupils: Pupils are equal, round, and reactive to light.   Neck:      Thyroid: No thyromegaly.      Vascular: No JVD.      Trachea: No tracheal deviation.   Cardiovascular:      Rate and Rhythm: Normal rate and regular rhythm.      Heart sounds: Normal heart sounds. No murmur heard.     No friction rub. No gallop.   Pulmonary:      Effort: Pulmonary effort is normal. No respiratory distress.      Breath sounds: Normal breath sounds. No stridor. No wheezing or rales.   Chest:      Chest wall: No tenderness.   Abdominal:      General: Bowel sounds are normal. There is no distension.      Palpations: Abdomen is soft. There is no mass.      Tenderness: There is no abdominal tenderness. There is no guarding or rebound.      Hernia: No hernia is present.   Musculoskeletal:         General: No tenderness. Normal range of motion.      Cervical back: Normal range of motion and neck supple.   Lymphadenopathy:      Cervical: No cervical adenopathy.   Skin:     General: Skin is warm and dry.      Coloration: Skin is not pale.      Findings: No erythema or rash.   Neurological:      Mental Status: He is alert and oriented to person, place, and time.      Cranial Nerves: No cranial nerve deficit.      Motor: No abnormal muscle tone.      Coordination: Coordination normal.      Deep Tendon Reflexes: Reflexes are normal and symmetric. Reflexes normal.   Psychiatric:         Behavior: Behavior normal.         Thought Content: Thought content normal.         Judgment: Judgment normal.            Assessment and Plan     1. Routine general medical examination at a health care facility  -     CBC Auto Differential; Future; Expected date: 10/02/2023  -     Comprehensive Metabolic Panel; Future; Expected date: 10/02/2023  -     Lipid Panel;  Future; Expected date: 10/02/2023  -     Hemoglobin A1C; Future; Expected date: 10/02/2023  -     PSA, Screening; Future; Expected date: 10/02/2023  -     Uric Acid; Future; Expected date: 10/02/2023  -     TESTOSTERONE; Future; Expected date: 10/02/2023    2. Hypertension, unspecified type  -     valsartan (DIOVAN) 160 MG tablet; Take 1 tablet (160 mg total) by mouth once daily.  Dispense: 90 tablet; Refill: 3  -     CBC Auto Differential; Future; Expected date: 10/02/2023  -     Comprehensive Metabolic Panel; Future; Expected date: 10/02/2023  -     Lipid Panel; Future; Expected date: 10/02/2023    3. CHETAN (obstructive sleep apnea)    4. IFG (impaired fasting glucose)  -     Comprehensive Metabolic Panel; Future; Expected date: 10/02/2023  -     Hemoglobin A1C; Future; Expected date: 10/02/2023    5. Chronic gout without tophus, unspecified cause, unspecified site  -     allopurinoL (ZYLOPRIM) 100 MG tablet; Take 1 tablet (100 mg total) by mouth once daily.  Dispense: 90 tablet; Refill: 3  -     Uric Acid; Future; Expected date: 10/02/2023    6. Hyperlipidemia, unspecified hyperlipidemia type  -     atorvastatin (LIPITOR) 20 MG tablet; Take 1 tablet (20 mg total) by mouth once daily.  Dispense: 90 tablet; Refill: 3  -     CBC Auto Differential; Future; Expected date: 10/02/2023  -     Comprehensive Metabolic Panel; Future; Expected date: 10/02/2023  -     Lipid Panel; Future; Expected date: 10/02/2023    7. Frequent PVCs  -     verapamiL (VERELAN) 180 MG C24P; Take 1 capsule (180 mg total) by mouth once daily.  Dispense: 90 capsule; Refill: 3    8. Encounter for screening for malignant neoplasm of prostate  -     PSA, Screening; Future; Expected date: 10/02/2023    9. Fatigue, unspecified type  -     TESTOSTERONE; Future; Expected date: 10/02/2023        Mauricio ACOSTA was seen today for annual exam and gout.    Diagnoses and all orders for this visit:    Routine general medical examination at a health care facility  -      CBC Auto Differential; Future  -     Comprehensive Metabolic Panel; Future  -     Lipid Panel; Future  -     Hemoglobin A1C; Future  -     PSA, Screening; Future  -     Uric Acid; Future  -     TESTOSTERONE; Future    Hypertension, unspecified type  -     valsartan (DIOVAN) 160 MG tablet; Take 1 tablet (160 mg total) by mouth once daily.  -     CBC Auto Differential; Future  -     Comprehensive Metabolic Panel; Future  -     Lipid Panel; Future    CHETAN (obstructive sleep apnea)    IFG (impaired fasting glucose)  -     Comprehensive Metabolic Panel; Future  -     Hemoglobin A1C; Future    Chronic gout without tophus, unspecified cause, unspecified site  -     allopurinoL (ZYLOPRIM) 100 MG tablet; Take 1 tablet (100 mg total) by mouth once daily.  -     Uric Acid; Future    Hyperlipidemia, unspecified hyperlipidemia type  -     atorvastatin (LIPITOR) 20 MG tablet; Take 1 tablet (20 mg total) by mouth once daily.  -     CBC Auto Differential; Future  -     Comprehensive Metabolic Panel; Future  -     Lipid Panel; Future    Frequent PVCs  -     verapamiL (VERELAN) 180 MG C24P; Take 1 capsule (180 mg total) by mouth once daily.    Encounter for screening for malignant neoplasm of prostate  -     PSA, Screening; Future    Fatigue, unspecified type  -     TESTOSTERONE; Future      Wellness check  -normal exam  -labs    HTN  -controlled    IGT  -diet control    Gout chronic  -start allopurinol    Diet/exercise    Spent adequate time in obtaining history and explaining differentials    Follow up in about 1 year (around 10/2/2024), or if symptoms worsen or fail to improve.           Follow up in about 1 year (around 10/2/2024), or if symptoms worsen or fail to improve.

## 2023-10-03 ENCOUNTER — LAB VISIT (OUTPATIENT)
Dept: LAB | Facility: HOSPITAL | Age: 48
End: 2023-10-03
Attending: FAMILY MEDICINE
Payer: COMMERCIAL

## 2023-10-03 DIAGNOSIS — R53.83 FATIGUE, UNSPECIFIED TYPE: ICD-10-CM

## 2023-10-03 DIAGNOSIS — Z12.5 ENCOUNTER FOR SCREENING FOR MALIGNANT NEOPLASM OF PROSTATE: ICD-10-CM

## 2023-10-03 DIAGNOSIS — E78.5 HYPERLIPIDEMIA, UNSPECIFIED HYPERLIPIDEMIA TYPE: ICD-10-CM

## 2023-10-03 DIAGNOSIS — Z00.00 ROUTINE GENERAL MEDICAL EXAMINATION AT A HEALTH CARE FACILITY: ICD-10-CM

## 2023-10-03 DIAGNOSIS — M1A.9XX0 CHRONIC GOUT WITHOUT TOPHUS, UNSPECIFIED CAUSE, UNSPECIFIED SITE: ICD-10-CM

## 2023-10-03 DIAGNOSIS — R73.01 IFG (IMPAIRED FASTING GLUCOSE): ICD-10-CM

## 2023-10-03 DIAGNOSIS — I10 HYPERTENSION, UNSPECIFIED TYPE: ICD-10-CM

## 2023-10-03 LAB
ALBUMIN SERPL BCP-MCNC: 3.8 G/DL (ref 3.5–5.2)
ALP SERPL-CCNC: 49 U/L (ref 55–135)
ALT SERPL W/O P-5'-P-CCNC: 14 U/L (ref 10–44)
ANION GAP SERPL CALC-SCNC: 10 MMOL/L (ref 8–16)
AST SERPL-CCNC: 12 U/L (ref 10–40)
BASOPHILS # BLD AUTO: 0.07 K/UL (ref 0–0.2)
BASOPHILS NFR BLD: 0.6 % (ref 0–1.9)
BILIRUB SERPL-MCNC: 0.7 MG/DL (ref 0.1–1)
BUN SERPL-MCNC: 14 MG/DL (ref 6–20)
CALCIUM SERPL-MCNC: 8.9 MG/DL (ref 8.7–10.5)
CHLORIDE SERPL-SCNC: 103 MMOL/L (ref 95–110)
CHOLEST SERPL-MCNC: 112 MG/DL (ref 120–199)
CHOLEST/HDLC SERPL: 3.6 {RATIO} (ref 2–5)
CO2 SERPL-SCNC: 25 MMOL/L (ref 23–29)
COMPLEXED PSA SERPL-MCNC: 0.35 NG/ML (ref 0–4)
CREAT SERPL-MCNC: 0.9 MG/DL (ref 0.5–1.4)
DIFFERENTIAL METHOD: ABNORMAL
EOSINOPHIL # BLD AUTO: 0.6 K/UL (ref 0–0.5)
EOSINOPHIL NFR BLD: 4.9 % (ref 0–8)
ERYTHROCYTE [DISTWIDTH] IN BLOOD BY AUTOMATED COUNT: 12.5 % (ref 11.5–14.5)
EST. GFR  (NO RACE VARIABLE): >60 ML/MIN/1.73 M^2
ESTIMATED AVG GLUCOSE: 114 MG/DL (ref 68–131)
GLUCOSE SERPL-MCNC: 96 MG/DL (ref 70–110)
HBA1C MFR BLD: 5.6 % (ref 4–5.6)
HCT VFR BLD AUTO: 43.3 % (ref 40–54)
HDLC SERPL-MCNC: 31 MG/DL (ref 40–75)
HDLC SERPL: 27.7 % (ref 20–50)
HGB BLD-MCNC: 14.5 G/DL (ref 14–18)
IMM GRANULOCYTES # BLD AUTO: 0.11 K/UL (ref 0–0.04)
IMM GRANULOCYTES NFR BLD AUTO: 1 % (ref 0–0.5)
LDLC SERPL CALC-MCNC: 58.4 MG/DL (ref 63–159)
LYMPHOCYTES # BLD AUTO: 4.5 K/UL (ref 1–4.8)
LYMPHOCYTES NFR BLD: 39 % (ref 18–48)
MCH RBC QN AUTO: 28.3 PG (ref 27–31)
MCHC RBC AUTO-ENTMCNC: 33.5 G/DL (ref 32–36)
MCV RBC AUTO: 85 FL (ref 82–98)
MONOCYTES # BLD AUTO: 0.8 K/UL (ref 0.3–1)
MONOCYTES NFR BLD: 7.3 % (ref 4–15)
NEUTROPHILS # BLD AUTO: 5.5 K/UL (ref 1.8–7.7)
NEUTROPHILS NFR BLD: 47.2 % (ref 38–73)
NONHDLC SERPL-MCNC: 81 MG/DL
NRBC BLD-RTO: 0 /100 WBC
PLATELET # BLD AUTO: 251 K/UL (ref 150–450)
PMV BLD AUTO: 10.6 FL (ref 9.2–12.9)
POTASSIUM SERPL-SCNC: 3.9 MMOL/L (ref 3.5–5.1)
PROT SERPL-MCNC: 6.7 G/DL (ref 6–8.4)
RBC # BLD AUTO: 5.12 M/UL (ref 4.6–6.2)
SODIUM SERPL-SCNC: 138 MMOL/L (ref 136–145)
TESTOST SERPL-MCNC: 261 NG/DL (ref 304–1227)
TRIGL SERPL-MCNC: 113 MG/DL (ref 30–150)
URATE SERPL-MCNC: 9 MG/DL (ref 3.4–7)
WBC # BLD AUTO: 11.57 K/UL (ref 3.9–12.7)

## 2023-10-03 PROCEDURE — 85025 COMPLETE CBC W/AUTO DIFF WBC: CPT | Performed by: FAMILY MEDICINE

## 2023-10-03 PROCEDURE — 84403 ASSAY OF TOTAL TESTOSTERONE: CPT | Performed by: FAMILY MEDICINE

## 2023-10-03 PROCEDURE — 80061 LIPID PANEL: CPT | Performed by: FAMILY MEDICINE

## 2023-10-03 PROCEDURE — 80053 COMPREHEN METABOLIC PANEL: CPT | Performed by: FAMILY MEDICINE

## 2023-10-03 PROCEDURE — 84153 ASSAY OF PSA TOTAL: CPT | Performed by: FAMILY MEDICINE

## 2023-10-03 PROCEDURE — 36415 COLL VENOUS BLD VENIPUNCTURE: CPT | Performed by: FAMILY MEDICINE

## 2023-10-03 PROCEDURE — 84550 ASSAY OF BLOOD/URIC ACID: CPT | Performed by: FAMILY MEDICINE

## 2023-10-03 PROCEDURE — 83036 HEMOGLOBIN GLYCOSYLATED A1C: CPT | Performed by: FAMILY MEDICINE

## 2023-10-04 ENCOUNTER — PATIENT MESSAGE (OUTPATIENT)
Dept: FAMILY MEDICINE | Facility: CLINIC | Age: 48
End: 2023-10-04
Payer: COMMERCIAL

## 2023-10-04 NOTE — TELEPHONE ENCOUNTER
Please inform his lab showed below normal testosterone - ask him if he want urology for management? Or will he contact his PCP

## 2023-10-10 ENCOUNTER — TELEPHONE (OUTPATIENT)
Dept: FAMILY MEDICINE | Facility: CLINIC | Age: 48
End: 2023-10-10
Payer: COMMERCIAL

## 2023-10-10 ENCOUNTER — PATIENT MESSAGE (OUTPATIENT)
Dept: FAMILY MEDICINE | Facility: CLINIC | Age: 48
End: 2023-10-10
Payer: COMMERCIAL

## 2023-10-10 DIAGNOSIS — R79.89 LOW TESTOSTERONE: Primary | ICD-10-CM

## 2023-10-18 ENCOUNTER — TELEPHONE (OUTPATIENT)
Dept: UROLOGY | Facility: CLINIC | Age: 48
End: 2023-10-18
Payer: COMMERCIAL

## 2023-10-18 NOTE — TELEPHONE ENCOUNTER
Spoke with patient to get his missed appointment rescheduled. Pt was looking for the next available appointment with any provider and agreed to virtual visit with Dr. Borrego on 10/23 at 10 am.

## 2023-10-23 ENCOUNTER — OFFICE VISIT (OUTPATIENT)
Dept: UROLOGY | Facility: CLINIC | Age: 48
End: 2023-10-23
Attending: UROLOGY
Payer: COMMERCIAL

## 2023-10-23 DIAGNOSIS — R79.89 LOW TESTOSTERONE: ICD-10-CM

## 2023-10-23 PROCEDURE — 4010F ACE/ARB THERAPY RXD/TAKEN: CPT | Mod: CPTII,95,, | Performed by: UROLOGY

## 2023-10-23 PROCEDURE — 1159F PR MEDICATION LIST DOCUMENTED IN MEDICAL RECORD: ICD-10-PCS | Mod: CPTII,95,, | Performed by: UROLOGY

## 2023-10-23 PROCEDURE — 3044F PR MOST RECENT HEMOGLOBIN A1C LEVEL <7.0%: ICD-10-PCS | Mod: CPTII,95,, | Performed by: UROLOGY

## 2023-10-23 PROCEDURE — 4010F PR ACE/ARB THEARPY RXD/TAKEN: ICD-10-PCS | Mod: CPTII,95,, | Performed by: UROLOGY

## 2023-10-23 PROCEDURE — 1160F RVW MEDS BY RX/DR IN RCRD: CPT | Mod: CPTII,95,, | Performed by: UROLOGY

## 2023-10-23 PROCEDURE — 1160F PR REVIEW ALL MEDS BY PRESCRIBER/CLIN PHARMACIST DOCUMENTED: ICD-10-PCS | Mod: CPTII,95,, | Performed by: UROLOGY

## 2023-10-23 PROCEDURE — 99204 OFFICE O/P NEW MOD 45 MIN: CPT | Mod: 95,,, | Performed by: UROLOGY

## 2023-10-23 PROCEDURE — 1159F MED LIST DOCD IN RCRD: CPT | Mod: CPTII,95,, | Performed by: UROLOGY

## 2023-10-23 PROCEDURE — 99204 PR OFFICE/OUTPT VISIT, NEW, LEVL IV, 45-59 MIN: ICD-10-PCS | Mod: 95,,, | Performed by: UROLOGY

## 2023-10-23 PROCEDURE — 3044F HG A1C LEVEL LT 7.0%: CPT | Mod: CPTII,95,, | Performed by: UROLOGY

## 2023-10-23 NOTE — PROGRESS NOTES
The patient location is: Home  The chief complaint leading to consultation is: low energy, low testosterone    Visit type: audiovisual    Face to Face time with patient: 10    20 minutes of total time spent on the encounter, which includes face to face time and non-face to face time preparing to see the patient (eg, review of tests), Obtaining and/or reviewing separately obtained history, Documenting clinical information in the electronic or other health record, Independently interpreting results (not separately reported) and communicating results to the patient/family/caregiver, or Care coordination (not separately reported).     Each patient to whom he or she provides medical services by telemedicine is:  (1) informed of the relationship between the physician and patient and the respective role of any other health care provider with respect to management of the patient; and (2) notified that he or she may decline to receive medical services by telemedicine and may withdraw from such care at any time.    Notes:      Subjective:      Mauricio Fernandes is a 48 y.o. male who was referred by Hiro Sainz MD for evaluation of low testosterone.      Hypogonadism  The patient reports a recent lab test indicating low testosterone.  This test was done by PCP due to patient symptoms.  He reports associated symptoms including low energy and some decreased libido. These symptoms have been present for a few months and were gradual in onset.  Previous treatment has included none.  Associated medical conditions include CHETAN - has CPAP but doesn't use consistently.      The following portions of the patient's history were reviewed and updated as appropriate: allergies, current medications, past family history, past medical history, past social history, past surgical history and problem list.    Review of Systems  Constitutional: no fever or chills  ENT: no nasal congestion or sore throat  Respiratory: no cough or shortness of  breath  Cardiovascular: no chest pain or palpitations  Gastrointestinal: no nausea or vomiting, tolerating diet  Genitourinary: as per HPI  Hematologic/Lymphatic: no easy bruising or lymphadenopathy  Musculoskeletal: no arthralgias or myalgias  Neurological: no seizures or tremors  Behavioral/Psych: no auditory or visual hallucinations     Objective:   Vitals: There were no vitals taken for this visit.    Physical Exam   General: alert and oriented, no acute distress  Head: normocephalic, atraumatic  Neck: supple, no lymphadenopathy, normal ROM, no masses  Neuro: alert and oriented x3, no gross deficits  Psych: normal judgment and insight, normal mood/affect, and non-anxious    Lab Review     Lab Results   Component Value Date    WBC 11.57 10/03/2023    HGB 14.5 10/03/2023    HCT 43.3 10/03/2023    MCV 85 10/03/2023     10/03/2023     Lab Results   Component Value Date    CREATININE 0.9 10/03/2023    BUN 14 10/03/2023     Lab Results   Component Value Date    PSA 0.35 10/03/2023     Component      Latest Ref Rng 10/3/2023   Testosterone, Total      304 - 1227 ng/dL 261 (L)         Assessment:     1. Low testosterone        Plan:   T panel, LH, and prolactin  FU after above

## 2023-10-24 ENCOUNTER — TELEPHONE (OUTPATIENT)
Dept: UROLOGY | Facility: CLINIC | Age: 48
End: 2023-10-24
Payer: COMMERCIAL

## 2023-10-30 ENCOUNTER — LAB VISIT (OUTPATIENT)
Dept: LAB | Facility: HOSPITAL | Age: 48
End: 2023-10-30
Payer: COMMERCIAL

## 2023-10-30 DIAGNOSIS — R79.89 LOW TESTOSTERONE: ICD-10-CM

## 2023-10-30 LAB
LH SERPL-ACNC: 4.5 MIU/ML (ref 0.6–12.1)
PROLACTIN SERPL IA-MCNC: 25.8 NG/ML (ref 3.5–19.4)

## 2023-10-30 PROCEDURE — 82040 ASSAY OF SERUM ALBUMIN: CPT | Performed by: UROLOGY

## 2023-10-30 PROCEDURE — 36415 COLL VENOUS BLD VENIPUNCTURE: CPT | Performed by: UROLOGY

## 2023-10-30 PROCEDURE — 84146 ASSAY OF PROLACTIN: CPT | Performed by: UROLOGY

## 2023-10-30 PROCEDURE — 83002 ASSAY OF GONADOTROPIN (LH): CPT | Performed by: UROLOGY

## 2023-11-06 LAB
ALBUMIN SERPL-MCNC: 4.3 G/DL (ref 3.6–5.1)
SHBG SERPL-SCNC: 18 NMOL/L (ref 10–50)
TESTOST FREE SERPL-MCNC: 44.8 PG/ML (ref 46–224)
TESTOST SERPL-MCNC: 228 NG/DL (ref 250–1100)
TESTOSTERONE.FREE+WB SERPL-MCNC: 88.3 NG/DL

## 2023-11-13 ENCOUNTER — OFFICE VISIT (OUTPATIENT)
Dept: UROLOGY | Facility: CLINIC | Age: 48
End: 2023-11-13
Attending: UROLOGY
Payer: COMMERCIAL

## 2023-11-13 VITALS
DIASTOLIC BLOOD PRESSURE: 79 MMHG | BODY MASS INDEX: 44.81 KG/M2 | SYSTOLIC BLOOD PRESSURE: 156 MMHG | WEIGHT: 285.5 LBS | HEART RATE: 68 BPM | HEIGHT: 67 IN | OXYGEN SATURATION: 96 %

## 2023-11-13 DIAGNOSIS — R79.89 LOW TESTOSTERONE: Primary | ICD-10-CM

## 2023-11-13 PROCEDURE — 3077F SYST BP >= 140 MM HG: CPT | Mod: CPTII,S$GLB,, | Performed by: UROLOGY

## 2023-11-13 PROCEDURE — 3044F PR MOST RECENT HEMOGLOBIN A1C LEVEL <7.0%: ICD-10-PCS | Mod: CPTII,S$GLB,, | Performed by: UROLOGY

## 2023-11-13 PROCEDURE — 3078F PR MOST RECENT DIASTOLIC BLOOD PRESSURE < 80 MM HG: ICD-10-PCS | Mod: CPTII,S$GLB,, | Performed by: UROLOGY

## 2023-11-13 PROCEDURE — 4010F ACE/ARB THERAPY RXD/TAKEN: CPT | Mod: CPTII,S$GLB,, | Performed by: UROLOGY

## 2023-11-13 PROCEDURE — 1160F PR REVIEW ALL MEDS BY PRESCRIBER/CLIN PHARMACIST DOCUMENTED: ICD-10-PCS | Mod: CPTII,S$GLB,, | Performed by: UROLOGY

## 2023-11-13 PROCEDURE — 1160F RVW MEDS BY RX/DR IN RCRD: CPT | Mod: CPTII,S$GLB,, | Performed by: UROLOGY

## 2023-11-13 PROCEDURE — 1159F PR MEDICATION LIST DOCUMENTED IN MEDICAL RECORD: ICD-10-PCS | Mod: CPTII,S$GLB,, | Performed by: UROLOGY

## 2023-11-13 PROCEDURE — 3008F BODY MASS INDEX DOCD: CPT | Mod: CPTII,S$GLB,, | Performed by: UROLOGY

## 2023-11-13 PROCEDURE — 4010F PR ACE/ARB THEARPY RXD/TAKEN: ICD-10-PCS | Mod: CPTII,S$GLB,, | Performed by: UROLOGY

## 2023-11-13 PROCEDURE — 1159F MED LIST DOCD IN RCRD: CPT | Mod: CPTII,S$GLB,, | Performed by: UROLOGY

## 2023-11-13 PROCEDURE — 3077F PR MOST RECENT SYSTOLIC BLOOD PRESSURE >= 140 MM HG: ICD-10-PCS | Mod: CPTII,S$GLB,, | Performed by: UROLOGY

## 2023-11-13 PROCEDURE — 3008F PR BODY MASS INDEX (BMI) DOCUMENTED: ICD-10-PCS | Mod: CPTII,S$GLB,, | Performed by: UROLOGY

## 2023-11-13 PROCEDURE — 99214 OFFICE O/P EST MOD 30 MIN: CPT | Mod: S$GLB,,, | Performed by: UROLOGY

## 2023-11-13 PROCEDURE — 3044F HG A1C LEVEL LT 7.0%: CPT | Mod: CPTII,S$GLB,, | Performed by: UROLOGY

## 2023-11-13 PROCEDURE — 3078F DIAST BP <80 MM HG: CPT | Mod: CPTII,S$GLB,, | Performed by: UROLOGY

## 2023-11-13 PROCEDURE — 99214 PR OFFICE/OUTPT VISIT, EST, LEVL IV, 30-39 MIN: ICD-10-PCS | Mod: S$GLB,,, | Performed by: UROLOGY

## 2023-11-13 RX ORDER — CLOMIPHENE CITRATE 50 MG/1
50 TABLET ORAL EVERY OTHER DAY
Qty: 15 TABLET | Refills: 11 | Status: SHIPPED | OUTPATIENT
Start: 2023-11-13 | End: 2024-11-12

## 2023-11-13 NOTE — PROGRESS NOTES
"Subjective:      Mauricio Fernandes is a 48 y.o. male who returns today regarding his low testosterone.    Here to review labs for possible low testosterone. He reports associated symptoms including low energy and some decreased libido. These symptoms have been present for a few months and were gradual in onset    The following portions of the patient's history were reviewed and updated as appropriate: allergies, current medications, past family history, past medical history, past social history, past surgical history and problem list.    Review of Systems  A comprehensive multipoint review of systems was negative except as otherwise stated in the HPI.     Objective:   Vitals: BP (!) 156/79 (BP Location: Right arm, Patient Position: Sitting, BP Method: Large (Automatic))   Pulse 68   Ht 5' 7" (1.702 m)   Wt 129.5 kg (285 lb 7.9 oz)   SpO2 96%   BMI 44.71 kg/m²     Physical Exam   General: alert and oriented, no acute distress  Respiratory: Symmetric expansion, non-labored breathing  Neuro: no gross deficits  Psych: normal judgment and insight, normal mood/affect, and non-anxious    Lab Review     Lab Results   Component Value Date    WBC 11.57 10/03/2023    HGB 14.5 10/03/2023    HCT 43.3 10/03/2023    MCV 85 10/03/2023     10/03/2023     Lab Results   Component Value Date    CREATININE 0.9 10/03/2023    BUN 14 10/03/2023     Lab Results   Component Value Date    PSA 0.35 10/03/2023     Component      Latest Ref Rng 10/3/2023 10/30/2023   Testosterone Total      250 - 1100 ng/dL  228 (L)    Testosterone, Free      46.0 - 224.0 pg/mL  44.8 (L)    Testosterone, Bioavailable      ng/dL  88.3 (L)    Sex Hormone Binding Globulin      10 - 50 nmol/L  18    Albumin      3.6 - 5.1 g/dL  4.3    Testosterone, Total      304 - 1227 ng/dL 261 (L)     LH      0.6 - 12.1 mIU/mL  4.5    Prolactin      3.5 - 19.4 ng/mL  25.8 (H)         Assessment and Plan:   1. Low testosterone  -- Discussed treatment options.  -- Trial " clomid  -- FU 3 mos w/ labs

## 2023-11-16 ENCOUNTER — TELEPHONE (OUTPATIENT)
Dept: UROLOGY | Facility: CLINIC | Age: 48
End: 2023-11-16
Payer: COMMERCIAL

## 2023-11-19 ENCOUNTER — PATIENT MESSAGE (OUTPATIENT)
Dept: UROLOGY | Facility: CLINIC | Age: 48
End: 2023-11-19
Payer: COMMERCIAL

## 2023-11-20 ENCOUNTER — TELEPHONE (OUTPATIENT)
Dept: UROLOGY | Facility: CLINIC | Age: 48
End: 2023-11-20
Payer: COMMERCIAL

## 2024-03-05 ENCOUNTER — PATIENT MESSAGE (OUTPATIENT)
Dept: UROLOGY | Facility: CLINIC | Age: 49
End: 2024-03-05
Payer: COMMERCIAL

## 2024-03-07 ENCOUNTER — OFFICE VISIT (OUTPATIENT)
Dept: FAMILY MEDICINE | Facility: CLINIC | Age: 49
End: 2024-03-07
Attending: FAMILY MEDICINE
Payer: COMMERCIAL

## 2024-03-07 VITALS
TEMPERATURE: 98 F | BODY MASS INDEX: 44.25 KG/M2 | HEIGHT: 67 IN | OXYGEN SATURATION: 97 % | DIASTOLIC BLOOD PRESSURE: 70 MMHG | SYSTOLIC BLOOD PRESSURE: 120 MMHG | WEIGHT: 281.94 LBS | HEART RATE: 80 BPM

## 2024-03-07 DIAGNOSIS — M79.605 LEFT LEG PAIN: Primary | ICD-10-CM

## 2024-03-07 DIAGNOSIS — M79.2 NEUROPATHIC PAIN: ICD-10-CM

## 2024-03-07 PROCEDURE — 99999 PR PBB SHADOW E&M-EST. PATIENT-LVL III: CPT | Mod: PBBFAC,,, | Performed by: FAMILY MEDICINE

## 2024-03-07 PROCEDURE — 1159F MED LIST DOCD IN RCRD: CPT | Mod: CPTII,S$GLB,, | Performed by: FAMILY MEDICINE

## 2024-03-07 PROCEDURE — 3008F BODY MASS INDEX DOCD: CPT | Mod: CPTII,S$GLB,, | Performed by: FAMILY MEDICINE

## 2024-03-07 PROCEDURE — 99214 OFFICE O/P EST MOD 30 MIN: CPT | Mod: S$GLB,,, | Performed by: FAMILY MEDICINE

## 2024-03-07 PROCEDURE — 3074F SYST BP LT 130 MM HG: CPT | Mod: CPTII,S$GLB,, | Performed by: FAMILY MEDICINE

## 2024-03-07 PROCEDURE — 4010F ACE/ARB THERAPY RXD/TAKEN: CPT | Mod: CPTII,S$GLB,, | Performed by: FAMILY MEDICINE

## 2024-03-07 PROCEDURE — 1160F RVW MEDS BY RX/DR IN RCRD: CPT | Mod: CPTII,S$GLB,, | Performed by: FAMILY MEDICINE

## 2024-03-07 PROCEDURE — 3078F DIAST BP <80 MM HG: CPT | Mod: CPTII,S$GLB,, | Performed by: FAMILY MEDICINE

## 2024-03-07 RX ORDER — GABAPENTIN 100 MG/1
100 CAPSULE ORAL NIGHTLY
Qty: 30 CAPSULE | Refills: 2 | Status: SHIPPED | OUTPATIENT
Start: 2024-03-07 | End: 2025-03-07

## 2024-03-07 NOTE — PROGRESS NOTES
Subjective     Patient ID: Mauricio Fernandes is a 48 y.o. male.    Chief Complaint: Leg Pain    48 yr old pleasant male with obesity, CHETAN< IFG, and other co morbidities presents today for evaluation of left thigh pain and numbness. Onset 2-3 weeks ago. Intermittent. Denies any back pain. Seen at urgent care and was given muscle relaxants and nsaids with no relief. No trauma or fall. Details as follows -    Leg Pain   The incident occurred more than 1 week ago. There was no injury mechanism. The pain is present in the left leg. The quality of the pain is described as aching. The pain is at a severity of 5/10. The pain is moderate. The pain has been Intermittent since onset. Associated symptoms include numbness. The symptoms are aggravated by movement and palpation. He has tried ice and NSAIDs for the symptoms. The treatment provided no relief.     Review of Systems   Constitutional: Negative.  Negative for activity change, diaphoresis and unexpected weight change.   HENT: Negative.  Negative for nasal congestion, ear pain, hearing loss, mouth sores, rhinorrhea, trouble swallowing and voice change.    Eyes: Negative.  Negative for pain, discharge and visual disturbance.   Respiratory: Negative.  Negative for apnea, cough, chest tightness and wheezing.    Cardiovascular: Negative.  Negative for chest pain and palpitations.   Gastrointestinal: Negative.  Negative for abdominal distention, anal bleeding, blood in stool, constipation, diarrhea and vomiting.   Endocrine: Negative.  Negative for cold intolerance, polydipsia and polyuria.   Genitourinary: Negative.  Negative for decreased urine volume, difficulty urinating, discharge, frequency, hematuria, scrotal swelling and urgency.   Musculoskeletal: Negative.  Positive for leg pain. Negative for arthralgias, back pain, joint swelling, myalgias, neck pain and neck stiffness.   Integumentary:  Negative for color change and rash. Negative.   Allergic/Immunologic: Negative.   Negative for environmental allergies.   Neurological:  Positive for numbness. Negative for dizziness, speech difficulty, weakness, light-headedness and headaches.   Hematological: Negative.    Psychiatric/Behavioral: Negative.  Negative for agitation, dysphoric mood and suicidal ideas. The patient is not nervous/anxious.      Past Medical History:   Diagnosis Date    Frequent PVCs 6/23/2021    Gout     Hypertension     IFG (impaired fasting glucose)     Obstructive sleep apnea        Past Surgical History:   Procedure Laterality Date    COLONOSCOPY N/A 4/26/2023    Procedure: COLONOSCOPY;  Surgeon: Elie Harman MD;  Location: [facility];  Service: Endoscopy;  Laterality: N/A;    WISDOM TOOTH EXTRACTION         Family History   Problem Relation Age of Onset    No Known Problems Mother     Prostate cancer Father         60s    No Known Problems Sister     No Known Problems Brother     No Known Problems Brother     Colon cancer Neg Hx     Heart disease Neg Hx     Diabetes Neg Hx        Social History     Socioeconomic History    Marital status: Single   Tobacco Use    Smoking status: Never    Smokeless tobacco: Former     Types: Chew     Quit date: 1/2/2014   Substance and Sexual Activity    Alcohol use: Yes     Comment: 1-2 per week.    Drug use: No    Sexual activity: Not Currently     Partners: Female   Social History Narrative    Lifestyle choices to lower blood pressure?        Diet?    DASH diet-high in fruits/vegetables and low in fat and saturated fat: 8-14 points?    Salt restriction--2.3 grams sodium/day: 2-8 points?        Weight loss-5-20 points per 20 lbs lost.?        Exercise-30min most days/wk: 4-9 points?        Limit Alcohol-2/day men; 1/day women: 2-4 points.                The DASH Diet: Healthy Eating to Control Your Blood Pressure         What is the DASH diet?        DASH stands for Dietary Approaches to Stop Hypertension. It is a balanced eating plan that your family doctor might  "recommend to help you lower your blood pressure. The DASH diet:    Is low in salt, saturated fat, cholesterol and total fat.     Emphasizes fruits, vegetables, and fat-free or low-fat milk and milk products.     Includes whole grains, fish, poultry and nuts.     Limits the amount of red meat, sweets, added sugars and sugar-containing beverages in your diet.     Is rich in potassium, magnesium and calcium, as well as protein and fiber.         How can the DASH diet help me stay healthy?    Getting too much sodium (salt) in your diet can contribute to high blood pressure (also called hypertension). Some salt is in foods naturally, and some salt is added to food when it is processed or prepared. Following the DASH diet can help you lower your blood pressure, or prevent high blood pressure, by reducing the amount of sodium in your diet to less than 2,300 mg per day.    The fruits, vegetables and whole grains recommended in the DASH diet provide many other elements of a healthy diet, such as lycopene, beta-carotene and isoflavones. These can help protect your body against common health conditions, such as cancer, osteoporosis, stroke and diabetes. Following the DASH diet can also help reduce your risk of heart disease by lowering your low-density lipoprotein (LDL, or "bad") cholesterol level.    Following the DASH diet may drop your blood pressure by a few points in as little as 2 weeks. However, you should not stop taking your blood pressure medicine, or any other prescribed medicine, without talking to your doctor first.        What kinds of foods are included in the DASH diet?    The DASH diet is nutritionally balanced for good health. You don't need to buy any special foods or pills, or cook with any special recipes, to follow the DASH diet. If you follow the DASH diet, you will eat about 2,000 calories each day. These calories will come from a variety of foods.        Where is sodium in my diet?    Food " Serving Sodium Content    ¼ teaspoon table salt 575 mg    ½ teaspoon table salt 1,150 mg    1 teaspoon table salt 2,300 mg    1 hot dog 460 mg    1 regular fast-food hamburger 600 mg    2 ounces processed cheese 600 mg    1 tablespoon soy sauce 900 mg    1 serving frozen pizza with meat and vegetables 982 mg    8 ounces regular potato chips 1,192 mg        The DASH diet    Whole grains (6 to 8 servings a day)     Vegetables (4 to 5 servings a day)     Fruits (4 to 5 servings a day)     Low-fat or fat-free milk and milk products (2 to 3 servings a day)     Lean meats, poultry and fish (6 or fewer servings a day)     Nuts, seeds and beans (4 to 5 servings a week)     Fats and oils (2 to 3 servings a day)     Sweets, preferably low-fat or fat-free (5 or fewer a week)     Sodium (no more than 2,300 mg a day)     You can adapt the DASH diet to meet your needs. For example:    If you drink alcohol, limit yourself to 2 drinks or less per day for men and 1 drink or less per day for women.     To reduce your blood pressure even more, replace some of the carbohydrates in the DASH diet with low-fat protein and unsaturated fats.     If you need to lose weight, reduce the number of calories you eat to about 1,600 per day.     Follow a lower-sodium version (no more than 1,500 mg daily) if you are 40 years of age or older, you are  or you already have high blood pressure.         How can I change my eating habits?    Don't be discouraged if following the DASH diet is difficult at first. Start with small, achievable goals. The following ideas can help you make healthy changes:    Pay attention to your current eating habits. Make a list of everything you eat for 2 or 3 days. Compare this list to the DASH diet recommendations above and see what changes you need to make in your diet.     Make one change at a time. For example, start by choosing lower-fat versions of your favorite foods or adding more whole grains to  "your meals.     Learn what makes a serving for each type of food. For example, 1 serving equals 1 slice of bread, 8 ounces of milk, 1 cup of raw vegetables or 1/2 cup of cooked vegetables. For more serving sizes, go to the [facility] site. Don't have a measuring cup? One serving (3 ounces) of meat or poultry is about the size of a deck of cards. One serving (1/2 cup) of rice or potato looks like half a baseball, and a serving of cheese is about the size of 4 stacked dice.     If eating more fruits and vegetables gives you gas, bloating or diarrhea, increase these foods slowly. You can also talk to your family doctor about taking over-the-counter medicines to reduce these symptoms until your body adjusts.     Get more exercise. Physical activity helps lower your blood pressure and can help you lose more weight.     Use salt-free seasonings, such as spices and herbs, to add flavor to your recipes and reduce or eliminate salt.     Include as many fresh and unprocessed foods as possible. Cut back on frozen dinners, packaged mixes, canned soups and bottled salad dressings, which are often high in sodium.     When buying canned, frozen or processed foods, check nutrition labels for the amount of sodium, sugar and saturated fat. Look for the phrases "no salt added," "sodium-free," "low sodium" or "very low sodium" on food packages. Choose foods with monounsaturated and polyunsaturated fats.     Steam, grill, poach, roast or stir-hoyos foods. Use low-sodium broth or water instead of butter or oil for sautéing.     When you eat at a restaurant, ask how foods are prepared. Ask if your order can be made without added salt. Don't add salty condiments, such as ketchup, mustard, pickles or sauces, to your food.     Other Organizations    Centers for Disease Control and Prevention     National Heart, Lung, and Blood [place]     Written by [email or web address].[email or web address] editorial staff    Reviewed/Updated: 02/11    Created: 08/09              Social " Determinants of Health     Financial Resource Strain: Low Risk  (3/7/2024)    Overall Financial Resource Strain (CARDIA)     Difficulty of Paying Living Expenses: Not hard at all   Food Insecurity: No Food Insecurity (3/7/2024)    Hunger Vital Sign     Worried About Running Out of Food in the Last Year: Never true     Ran Out of Food in the Last Year: Never true   Transportation Needs: No Transportation Needs (3/7/2024)    PRAPARE - Transportation     Lack of Transportation (Medical): No     Lack of Transportation (Non-Medical): No   Physical Activity: Sufficiently Active (3/7/2024)    Exercise Vital Sign     Days of Exercise per Week: 4 days     Minutes of Exercise per Session: 50 min   Stress: No Stress Concern Present (3/7/2024)    [employer] [employer] - Occupational Stress Questionnaire     Feeling of Stress : Not at all   Social Connections: Unknown (3/7/2024)    Social Connection and Isolation Panel [NHANES]     Frequency of Communication with Friends and Family: More than three times a week     Frequency of Social Gatherings with Friends and Family: Once a week     Active Member of Clubs or Organizations: No     Attends Club or Organization Meetings: Never     Marital Status: Never    Housing Stability: Unknown (3/7/2024)    Housing Stability Vital Sign     Unable to Pay for Housing in the Last Year: No     Unstable Housing in the Last Year: No       Current Outpatient Medications   Medication Sig Dispense Refill    allopurinoL (ZYLOPRIM) 100 MG tablet Take 1 tablet (100 mg total) by mouth once daily. 90 tablet 3    atorvastatin (LIPITOR) 20 MG tablet Take 1 tablet (20 mg total) by mouth once daily. 90 tablet 3    clomiPHENE (CLOMID) 50 mg tablet Take 1 tablet (50 mg total) by mouth every other day. 15 tablet 11    gabapentin (NEURONTIN) 100 MG capsule Take 1 capsule (100 mg total) by mouth every evening. 30 capsule 2    indomethacin (INDOCIN) 50 MG capsule Take 1 capsule (50 mg  total) by mouth 3 (three) times daily as needed (Take with food for gout pain flare; do not take more than 7 days.). 30 capsule 1    LIDOcaine (LIDODERM) 5 % Place 1 patch onto the skin once daily. Remove & Discard patch within 12 hours or as directed by MD. For muscle and joint pain. 30 patch 1    valsartan (DIOVAN) 160 MG tablet Take 1 tablet (160 mg total) by mouth once daily. 90 tablet 3    verapamiL (VERELAN) 180 MG C24P Take 1 capsule (180 mg total) by mouth once daily. 90 capsule 3     No current facility-administered medications for this visit.       Review of patient's allergies indicates:   Allergen Reactions    Tylenol [acetaminophen] Swelling     Patient states his face starts to swell.    Tylenol headache plus Swelling          Objective   Vitals:    03/07/24 0815   BP: 120/70   Pulse: 80   Temp: 98 °F (36.7 °C)       Physical Exam  Constitutional:       Appearance: He is well-developed.   HENT:      Head: Normocephalic and atraumatic.      Right Ear: External ear normal.      Left Ear: External ear normal.      Nose: Nose normal.      Mouth/Throat:      Pharynx: No oropharyngeal exudate.   Eyes:      General: No scleral icterus.        Right eye: No discharge.         Left eye: No discharge.      Conjunctiva/sclera: Conjunctivae normal.      Pupils: Pupils are equal, round, and reactive to light.   Neck:      Thyroid: No thyromegaly.      Vascular: No JVD.      Trachea: No tracheal deviation.   Cardiovascular:      Rate and Rhythm: Normal rate and regular rhythm.      Heart sounds: Normal heart sounds. No murmur heard.     No friction rub. No gallop.   Pulmonary:      Effort: Pulmonary effort is normal. No respiratory distress.      Breath sounds: Normal breath sounds. No stridor. No wheezing or rales.   Chest:      Chest wall: No tenderness.   Abdominal:      General: Bowel sounds are normal. There is no distension.      Palpations: Abdomen is soft. There is no mass.      Tenderness: There is no  abdominal tenderness. There is no guarding or rebound.      Hernia: No hernia is present.   Musculoskeletal:         General: Tenderness (ttp left thigh and numb) present. Normal range of motion.      Cervical back: Normal range of motion and neck supple.   Lymphadenopathy:      Cervical: No cervical adenopathy.   Skin:     General: Skin is warm and dry.      Coloration: Skin is not pale.      Findings: No erythema or rash.   Neurological:      Mental Status: He is alert and oriented to person, place, and time.      Cranial Nerves: No cranial nerve deficit.      Motor: No abnormal muscle tone.      Coordination: Coordination normal.      Deep Tendon Reflexes: Reflexes are normal and symmetric. Reflexes normal.   Psychiatric:         Behavior: Behavior normal.         Thought Content: Thought content normal.         Judgment: Judgment normal.            Assessment and Plan     1. Left leg pain  -     gabapentin (NEURONTIN) 100 MG capsule; Take 1 capsule (100 mg total) by mouth every evening.  Dispense: 30 capsule; Refill: 2    2. Neuropathic pain  -     gabapentin (NEURONTIN) 100 MG capsule; Take 1 capsule (100 mg total) by mouth every evening.  Dispense: 30 capsule; Refill: 2        Left leg pain/neuropathic pain  Rest, heat pads, trial of gabapentin nightly.Side effects of medications have been discussed and patient agreed to proceed with treatment and understands the risks and benefits.  Ortho/NS/PT precautions given      Spent adequate time in obtaining history and explaining differentials    30 minutes spent during this visit of which greater than 50% devoted to face-face counseling and coordination of care regarding diagnosis and management plan           Follow up in about 4 weeks (around 4/4/2024), or if symptoms worsen or fail to improve.

## 2024-04-04 ENCOUNTER — LAB VISIT (OUTPATIENT)
Dept: LAB | Facility: HOSPITAL | Age: 49
End: 2024-04-04
Attending: UROLOGY
Payer: COMMERCIAL

## 2024-04-04 DIAGNOSIS — R79.89 LOW TESTOSTERONE: ICD-10-CM

## 2024-04-04 LAB — TESTOST SERPL-MCNC: 579 NG/DL (ref 304–1227)

## 2024-04-04 PROCEDURE — 84403 ASSAY OF TOTAL TESTOSTERONE: CPT | Performed by: UROLOGY

## 2024-04-04 PROCEDURE — 36415 COLL VENOUS BLD VENIPUNCTURE: CPT | Performed by: UROLOGY

## 2024-10-03 DIAGNOSIS — I49.3 FREQUENT PVCS: ICD-10-CM

## 2024-10-03 RX ORDER — VERAPAMIL HYDROCHLORIDE 180 MG/1
180 CAPSULE, EXTENDED RELEASE ORAL
Qty: 90 CAPSULE | Refills: 3 | Status: SHIPPED | OUTPATIENT
Start: 2024-10-03

## 2024-10-03 NOTE — TELEPHONE ENCOUNTER
Care Due:                  Date            Visit Type   Department     Provider  --------------------------------------------------------------------------------                                MYCHART                              FOLLOWUP/OF  [facility]    Hiro Davenport  Last Visit: 03-      FICE VISIT   MEDICINE       [facility]                              ANNUAL                              CHECKUP/PHY  [facility]    Hiro Quintanafly  Next Visit: 10-      S            MEDICINE       [place]                                                            Last  Test          Frequency    Reason                     Performed    Due Date  --------------------------------------------------------------------------------    CBC.........  12 months..  allopurinoL..............  10-   09-    CMP.........  12 months..  allopurinoL,               10-   09-                             atorvastatin, valsartan..    Lipid Panel.  12 months..  atorvastatin.............  10-   09-    Uric Acid...  12 months..  allopurinoL..............  10-   09-    [employer] Embedded Care Due Messages. Reference number: [id number].   10/03/2024 5:55:00 AM CDT

## 2024-10-23 DIAGNOSIS — M1A.9XX0 CHRONIC GOUT WITHOUT TOPHUS, UNSPECIFIED CAUSE, UNSPECIFIED SITE: ICD-10-CM

## 2024-10-23 DIAGNOSIS — E78.5 HYPERLIPIDEMIA, UNSPECIFIED HYPERLIPIDEMIA TYPE: ICD-10-CM

## 2024-10-23 DIAGNOSIS — I10 HYPERTENSION, UNSPECIFIED TYPE: ICD-10-CM

## 2024-10-23 NOTE — TELEPHONE ENCOUNTER
No care due was identified.  [facility] Embedded Care Due Messages. Reference number: [id number].   10/23/2024 6:47:28 PM CDT

## 2024-10-24 RX ORDER — ALLOPURINOL 100 MG/1
100 TABLET ORAL
Qty: 90 TABLET | Refills: 3 | Status: SHIPPED | OUTPATIENT
Start: 2024-10-24

## 2024-10-24 RX ORDER — ATORVASTATIN CALCIUM 20 MG/1
20 TABLET, FILM COATED ORAL
Qty: 90 TABLET | Refills: 3 | Status: SHIPPED | OUTPATIENT
Start: 2024-10-24

## 2024-10-24 RX ORDER — VALSARTAN 160 MG/1
160 TABLET ORAL
Qty: 90 TABLET | Refills: 3 | Status: SHIPPED | OUTPATIENT
Start: 2024-10-24

## 2024-12-09 ENCOUNTER — PATIENT MESSAGE (OUTPATIENT)
Dept: FAMILY MEDICINE | Facility: CLINIC | Age: 49
End: 2024-12-09
Payer: COMMERCIAL

## 2024-12-10 ENCOUNTER — PATIENT MESSAGE (OUTPATIENT)
Dept: FAMILY MEDICINE | Facility: CLINIC | Age: 49
End: 2024-12-10
Payer: COMMERCIAL

## 2025-01-15 ENCOUNTER — PATIENT MESSAGE (OUTPATIENT)
Dept: FAMILY MEDICINE | Facility: CLINIC | Age: 50
End: 2025-01-15

## 2025-02-25 ENCOUNTER — TELEPHONE (OUTPATIENT)
Dept: UROLOGY | Facility: CLINIC | Age: 50
End: 2025-02-25
Payer: COMMERCIAL

## 2025-03-14 NOTE — PROGRESS NOTES
Patient ID: Mauricio Fernandes is a 49 y.o. male.    Chief Complaint: Low Testosterone    History of Present Illness    CHIEF COMPLAINT:  Patient presents today for follow up of low testosterone    HISTORY OF PRESENT ILLNESS:  He reports persistent low energy levels and describes feeling fatigued. He also reports decreased libido and less frequent erections. He was previously treated with Clomid for 3-4 months. Despite testosterone levels doubling during treatment, symptoms did not improve.    MEDICAL HISTORY:  He has hypertension, gout (controlled with dietary modifications), and sleep apnea managed with CPAP.    FAMILY HISTORY:  Father had prostate cancer.      ROS:  General: -fever, -chills, +fatigue, -weight gain, -weight loss  Eyes: -vision changes, -redness, -discharge  ENT: -ear pain, -nasal congestion, -sore throat  Cardiovascular: -chest pain, -palpitations, -lower extremity edema  Respiratory: -cough, -shortness of breath  Gastrointestinal: -abdominal pain, -nausea, -vomiting, -diarrhea, -constipation, -blood in stool  Genitourinary: -dysuria, -hematuria, -frequency  Musculoskeletal: -joint pain, -muscle pain  Skin: -rash, -lesion  Neurological: -headache, -dizziness, -numbness, -tingling  Psychiatric: -anxiety, -depression, -sleep difficulty  Male Genitourinary: +decreased libido, +erectile dysfunction         Physical Exam    Vitals: Blood pressure is a little elevated.  General: No acute distress. Well-developed. Well-nourished.  Respiratory: Normal respiratory effort.   Musculoskeletal: No  obvious deformity.  Extremities: No lower extremity edema.  Neurological: Alert & oriented x3. No slurred speech. Normal gait.  Psychiatric: Normal mood. Normal affect. Good insight. Good judgment.  Skin: Warm. Dry. No rash.         Assessment & Plan    E29.1 Testicular hypofunction  I10 Essential (primary) hypertension  M10.9 Gout, unspecified  G47.33 Obstructive sleep apnea (adult) (pediatric)  N52.9 Male erectile  dysfunction, unspecified  Z80.42 Family history of malignant neoplasm of prostate    TESTICULAR HYPOFUNCTION (LOW TESTOSTERONE):  - Considered testosterone replacement therapy for low testosterone levels and symptoms of fatigue and low sex drive.  - Discussed risks and benefits of testosterone therapy, including potential side effects Such as polycythemiaand gynecomastia.  - Initiated testosterone injections every 2 weeks, with potential adjustment to dosing frequency or amount based on symptom response.  - Informed patient about the potential need for regular blood donations if blood thickening occurs on testosterone therapy.  - Ordered CBC, testosterone levels, and PSA as morning labs.      PROSTATE HEALTH AND FAMILY HISTORY:  - Noted importance of monitoring blood counts and PSA levels due to family history of prostate cancer.  - Recommend prostate exam prior to starting testosterone therapy.      ERECTILE DYSFUNCTION:  - Discussed that erectile dysfunction is primarily related to blood flow issues, not testosterone levels.    FOLLOW-UP:  - Follow up in 2 months to assess symptom response after starting testosterone therapy.  - Contact the office through patient portal with any questions or concerns.       --patient would like to start injection therapy at home every 2 weeks.  Will send prescription once labs are obtained    This note was generated with the assistance of ambient listening technology.  I attest to having reviewed and edited the generated note for accuracy, though some syntax or spelling errors may persist. Please contact the author of this note for any clarification.

## 2025-03-19 ENCOUNTER — OFFICE VISIT (OUTPATIENT)
Dept: UROLOGY | Facility: CLINIC | Age: 50
End: 2025-03-19
Payer: COMMERCIAL

## 2025-03-19 VITALS
BODY MASS INDEX: 45.71 KG/M2 | HEART RATE: 59 BPM | SYSTOLIC BLOOD PRESSURE: 147 MMHG | DIASTOLIC BLOOD PRESSURE: 95 MMHG | WEIGHT: 291.25 LBS | HEIGHT: 67 IN

## 2025-03-19 DIAGNOSIS — R79.89 LOW TESTOSTERONE: Primary | ICD-10-CM

## 2025-03-19 DIAGNOSIS — G47.33 OSA (OBSTRUCTIVE SLEEP APNEA): Chronic | ICD-10-CM

## 2025-03-19 DIAGNOSIS — M1A.9XX0 CHRONIC GOUT WITHOUT TOPHUS, UNSPECIFIED CAUSE, UNSPECIFIED SITE: ICD-10-CM

## 2025-03-19 LAB
BILIRUB SERPL-MCNC: NORMAL MG/DL
BLOOD URINE, POC: NORMAL
CLARITY, POC UA: CLEAR
COLOR, POC UA: YELLOW
GLUCOSE UR QL STRIP: NORMAL
KETONES UR QL STRIP: NORMAL
LEUKOCYTE ESTERASE URINE, POC: NORMAL
NITRITE, POC UA: NORMAL
PH, POC UA: 5
POC RESIDUAL URINE VOLUME: 50 ML (ref 0–100)
PROTEIN, POC: NORMAL
SPECIFIC GRAVITY, POC UA: 1.01
UROBILINOGEN, POC UA: NORMAL

## 2025-03-19 PROCEDURE — 3080F DIAST BP >= 90 MM HG: CPT | Mod: CPTII,S$GLB,, | Performed by: NURSE PRACTITIONER

## 2025-03-19 PROCEDURE — 4010F ACE/ARB THERAPY RXD/TAKEN: CPT | Mod: CPTII,S$GLB,, | Performed by: NURSE PRACTITIONER

## 2025-03-19 PROCEDURE — 3008F BODY MASS INDEX DOCD: CPT | Mod: CPTII,S$GLB,, | Performed by: NURSE PRACTITIONER

## 2025-03-19 PROCEDURE — 3077F SYST BP >= 140 MM HG: CPT | Mod: CPTII,S$GLB,, | Performed by: NURSE PRACTITIONER

## 2025-03-19 PROCEDURE — 81002 URINALYSIS NONAUTO W/O SCOPE: CPT | Mod: S$GLB,,, | Performed by: NURSE PRACTITIONER

## 2025-03-19 PROCEDURE — 99214 OFFICE O/P EST MOD 30 MIN: CPT | Mod: S$GLB,,, | Performed by: NURSE PRACTITIONER

## 2025-03-19 PROCEDURE — 1159F MED LIST DOCD IN RCRD: CPT | Mod: CPTII,S$GLB,, | Performed by: NURSE PRACTITIONER

## 2025-03-19 PROCEDURE — 1160F RVW MEDS BY RX/DR IN RCRD: CPT | Mod: CPTII,S$GLB,, | Performed by: NURSE PRACTITIONER

## 2025-03-19 PROCEDURE — 99999 PR PBB SHADOW E&M-EST. PATIENT-LVL III: CPT | Mod: PBBFAC,,, | Performed by: NURSE PRACTITIONER

## 2025-03-19 PROCEDURE — 51798 US URINE CAPACITY MEASURE: CPT | Mod: S$GLB,,, | Performed by: NURSE PRACTITIONER

## 2025-04-02 ENCOUNTER — LAB VISIT (OUTPATIENT)
Dept: LAB | Facility: HOSPITAL | Age: 50
End: 2025-04-02
Attending: NURSE PRACTITIONER
Payer: COMMERCIAL

## 2025-04-02 ENCOUNTER — RESULTS FOLLOW-UP (OUTPATIENT)
Dept: UROLOGY | Facility: CLINIC | Age: 50
End: 2025-04-02

## 2025-04-02 DIAGNOSIS — R79.89 LOW TESTOSTERONE: Primary | ICD-10-CM

## 2025-04-02 DIAGNOSIS — R79.89 LOW TESTOSTERONE: ICD-10-CM

## 2025-04-02 LAB
ABSOLUTE EOSINOPHIL (OHS): 0.18 K/UL
ABSOLUTE MONOCYTE (OHS): 0.76 K/UL (ref 0.3–1)
ABSOLUTE NEUTROPHIL COUNT (OHS): 4.74 K/UL (ref 1.8–7.7)
BASOPHILS # BLD AUTO: 0.05 K/UL
BASOPHILS NFR BLD AUTO: 0.5 %
ERYTHROCYTE [DISTWIDTH] IN BLOOD BY AUTOMATED COUNT: 12.9 % (ref 11.5–14.5)
HCT VFR BLD AUTO: 43.3 % (ref 40–54)
HGB BLD-MCNC: 14.5 GM/DL (ref 14–18)
IMM GRANULOCYTES # BLD AUTO: 0.04 K/UL (ref 0–0.04)
IMM GRANULOCYTES NFR BLD AUTO: 0.4 % (ref 0–0.5)
LYMPHOCYTES # BLD AUTO: 4.33 K/UL (ref 1–4.8)
MCH RBC QN AUTO: 28.7 PG (ref 27–31)
MCHC RBC AUTO-ENTMCNC: 33.5 G/DL (ref 32–36)
MCV RBC AUTO: 86 FL (ref 82–98)
NUCLEATED RBC (/100WBC) (OHS): 0 /100 WBC
PLATELET # BLD AUTO: 245 K/UL (ref 150–450)
PMV BLD AUTO: 10.8 FL (ref 9.2–12.9)
PSA SERPL-MCNC: 0.45 NG/ML
RBC # BLD AUTO: 5.06 M/UL (ref 4.6–6.2)
RELATIVE EOSINOPHIL (OHS): 1.8 %
RELATIVE LYMPHOCYTE (OHS): 42.9 % (ref 18–48)
RELATIVE MONOCYTE (OHS): 7.5 % (ref 4–15)
RELATIVE NEUTROPHIL (OHS): 46.9 % (ref 38–73)
TESTOST SERPL-MCNC: 266 NG/DL (ref 304–1227)
WBC # BLD AUTO: 10.1 K/UL (ref 3.9–12.7)

## 2025-04-02 PROCEDURE — 85025 COMPLETE CBC W/AUTO DIFF WBC: CPT

## 2025-04-02 PROCEDURE — 36415 COLL VENOUS BLD VENIPUNCTURE: CPT

## 2025-04-02 PROCEDURE — 84153 ASSAY OF PSA TOTAL: CPT

## 2025-04-02 PROCEDURE — 84403 ASSAY OF TOTAL TESTOSTERONE: CPT

## 2025-04-02 RX ORDER — TESTOSTERONE CYPIONATE 200 MG/ML
200 INJECTION, SOLUTION INTRAMUSCULAR
Qty: 2 ML | Refills: 5 | Status: SHIPPED | OUTPATIENT
Start: 2025-04-02 | End: 2025-10-01

## 2025-04-10 ENCOUNTER — TELEPHONE (OUTPATIENT)
Dept: UROLOGY | Facility: CLINIC | Age: 50
End: 2025-04-10
Payer: COMMERCIAL

## 2025-04-10 DIAGNOSIS — R79.89 LOW TESTOSTERONE: Primary | ICD-10-CM

## 2025-04-10 RX ORDER — SYRINGE W-NEEDLE,DISPOSAB,3 ML 25GX5/8"
1 SYRINGE, EMPTY DISPOSABLE MISCELLANEOUS WEEKLY
Qty: 1 EACH | Refills: 1 | Status: SHIPPED | OUTPATIENT
Start: 2025-04-10

## 2025-04-10 RX ORDER — NEEDLES, DISPOSABLE 27GX1/2"
1 NEEDLE, DISPOSABLE MISCELLANEOUS WEEKLY
Qty: 1 EACH | Refills: 1 | Status: SHIPPED | OUTPATIENT
Start: 2025-04-10

## 2025-06-27 ENCOUNTER — TELEPHONE (OUTPATIENT)
Dept: UROLOGY | Facility: CLINIC | Age: 50
End: 2025-06-27
Payer: COMMERCIAL

## 2025-06-27 NOTE — TELEPHONE ENCOUNTER
Copied from CRM #[id number]. Topic: General Inquiry - Return Call  >> Jun 27, 2025 10:44 AM Gregoria wrote:  Returning call from Lina

## 2025-07-02 ENCOUNTER — OFFICE VISIT (OUTPATIENT)
Dept: UROLOGY | Facility: CLINIC | Age: 50
End: 2025-07-02
Payer: COMMERCIAL

## 2025-07-02 DIAGNOSIS — R79.89 LOW TESTOSTERONE: Primary | ICD-10-CM

## 2025-07-02 DIAGNOSIS — G47.33 OSA (OBSTRUCTIVE SLEEP APNEA): ICD-10-CM

## 2025-07-02 NOTE — PROGRESS NOTES
The patient location is: [place]  The chief complaint leading to consultation is: TRT follow up     Visit type: audiovisual    Face to Face time with patient: 5  10 minutes of total time spent on the encounter, which includes face to face time and non-face to face time preparing to see the patient (eg, review of tests), Obtaining and/or reviewing separately obtained history, Documenting clinical information in the electronic or other health record, Independently interpreting results (not separately reported) and communicating results to the patient/family/caregiver, or Care coordination (not separately reported).         Each patient to whom he or she provides medical services by telemedicine is:  (1) informed of the relationship between the physician and patient and the respective role of any other health care provider with respect to management of the patient; and (2) notified that he or she may decline to receive medical services by telemedicine and may withdraw from such care at any time.    Notes:   Subjective:      Mauricio Fernandes is a 49 y.o. male who returns today regarding his TRT.    CHIEF COMPLAINT:  Patient presents today for follow up of testosterone therapy.  The self injecting testosterone cypionate 200 mg every 2 weeks.    TESTOSTERONE THERAPY:  He reports testosterone therapy has been beneficial but experiences decreased energy levels towards the end of the two-week dosing cycle. He is currently exploring optimal injection frequency to maintain consistent energy levels. He recently had a vacation which provided additional rest and improved his overall well-being.    SLEEP:  He obtained 7-9 hours of sleep nightly during his recent two-week vacation. Since returning to work, he reports decreased sleep duration and increased fatigue. He is working on optimizing his sleep schedule.    The following portions of the patient's history were reviewed and updated as appropriate: allergies, current  medications, past family history, past medical history, past social history, past surgical history and problem list.    Review of Systems  A comprehensive multipoint review of systems was negative except as otherwise stated in the HPI.     Objective:   Vitals: There were no vitals taken for this visit.    Physical Exam   General: alert and oriented, no acute distress      Lab Review   Urinalysis demonstrates   Lab Results   Component Value Date    WBC 10.10 04/02/2025    HGB 14.5 04/02/2025    HGB 14.5 10/03/2023    HCT 43.3 04/02/2025    HCT 43.3 10/03/2023    MCV 86 04/02/2025    MCV 85 10/03/2023     04/02/2025     10/03/2023     Lab Results   Component Value Date    CREATININE 0.9 10/03/2023    BUN 14 10/03/2023     Lab Results   Component Value Date    PSA 0.45 04/02/2025    PSA 0.35 10/03/2023       Assessment and Plan:   1. Low testosterone  2. CHETAN (obstructive sleep apnea)    TESTICULAR HYPOFUNCTION (TESTOSTERONE THERAPY):  - Assessed response to testosterone therapy, noting improved energy levels during vacation but decreased energy towards end of 2-week injection cycle. Reviewed recent lab results, including PSA and blood count, which were stable and within acceptable range. Continued monitoring for potential side effects of testosterone therapy, particularly thickened blood over time. Explained that more frequent testosterone administration tends to result in fewer fluctuations in symptoms and that testosterone levels decrease towards the end of the injection cycle, potentially causing a return of symptoms. Continued testosterone at current dose of 1 cc every 2 weeks with option to adjust to 0.5 cc weekly if patient prefers; patient can experiment with injection frequency (every 9-14 days) to find optimal dosing schedule. Ordered labs to be completed in October, including testosterone levels, PSA, and blood count.    This note is dictated on M*Modal word recognition program.  There are word  recognition mistakes that are occasionally missed on review.            This note was generated with the assistance of ambient listening technology. Verbal consent was obtained by the patient and accompanying visitor(s) for the recording of patient appointment to facilitate this note. I attest to having reviewed and edited the generated note for accuracy, though some syntax or spelling errors may persist. Please contact the author of this note for any clarification.